# Patient Record
Sex: MALE | Race: WHITE | NOT HISPANIC OR LATINO | URBAN - METROPOLITAN AREA
[De-identification: names, ages, dates, MRNs, and addresses within clinical notes are randomized per-mention and may not be internally consistent; named-entity substitution may affect disease eponyms.]

---

## 2018-01-02 ENCOUNTER — EMERGENCY (EMERGENCY)
Facility: HOSPITAL | Age: 33
LOS: 0 days | Discharge: HOME | End: 2018-01-03

## 2018-01-02 DIAGNOSIS — J02.9 ACUTE PHARYNGITIS, UNSPECIFIED: ICD-10-CM

## 2018-01-02 DIAGNOSIS — J03.90 ACUTE TONSILLITIS, UNSPECIFIED: ICD-10-CM

## 2019-06-03 ENCOUNTER — EMERGENCY (EMERGENCY)
Facility: HOSPITAL | Age: 34
LOS: 0 days | Discharge: HOME | End: 2019-06-03
Admitting: EMERGENCY MEDICINE
Payer: MEDICAID

## 2019-06-03 VITALS
OXYGEN SATURATION: 95 % | TEMPERATURE: 99 F | HEART RATE: 94 BPM | SYSTOLIC BLOOD PRESSURE: 126 MMHG | RESPIRATION RATE: 18 BRPM | DIASTOLIC BLOOD PRESSURE: 70 MMHG
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
## 2019-06-03 DIAGNOSIS — S40.212A ABRASION OF LEFT SHOULDER, INITIAL ENCOUNTER: ICD-10-CM
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
## 2019-06-03 DIAGNOSIS — T74.11XA ADULT PHYSICAL ABUSE, CONFIRMED, INITIAL ENCOUNTER: ICD-10-CM

## 2019-06-03 DIAGNOSIS — S09.90XA UNSPECIFIED INJURY OF HEAD, INITIAL ENCOUNTER: ICD-10-CM

## 2019-06-03 DIAGNOSIS — S20.212A CONTUSION OF LEFT FRONT WALL OF THORAX, INITIAL ENCOUNTER: ICD-10-CM
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
## 2019-06-03 PROCEDURE — 71101 X-RAY EXAM UNILAT RIBS/CHEST: CPT | Mod: 26,LT

## 2019-06-03 PROCEDURE — 99283 EMERGENCY DEPT VISIT LOW MDM: CPT

## 2019-06-03 RX ORDER — ACETAMINOPHEN 500 MG
975 TABLET ORAL ONCE
Refills: 0 | Status: COMPLETED | OUTPATIENT
Start: 2019-06-03 | End: 2019-06-03

## 2019-06-03 RX ADMIN — Medication 975 MILLIGRAM(S): at 10:17

## 2019-06-03 NOTE — ED ADULT TRIAGE NOTE - MODE OF ARRIVAL
Barry BARRERA Javier   5/25/2017 8:15 AM   Anticoagulation Therapy Visit    Description:  53 year old male   Provider:  [facility]   Department:  [facility]           INR as of 5/25/2017     Today's INR 2.4      Anticoagulation Summary as of 5/25/2017     INR goal 2.0-3.0   Today's INR 2.4   Full instructions 5/25: 5 mg; 5/29: 5 mg; 6/1: 5 mg; 6/5: 5 mg; 6/8: 5 mg; 6/12: 5 mg; 6/15: 5 mg; Otherwise 10 mg on Mon; 7.5 mg all other days   Next INR check 6/16/2017    Indications   Long-term (current) use of anticoagulants [Z79.01] [Z79.01]  DVT (deep venous thrombosis) (H) (Resolved) [I82.409]         Your next Anticoagulation Clinic appointment(s)     Jun 16, 2017  8:15 AM CDT   Anticoagulation Visit with [facility]   [facility] [place] ([address] [address] [phone]              Contact Numbers     Clinic Number:         May 2017 Details    Sun Mon Tue Wed Thu Fri Sat      1               2               3               4               5               6                 7               8               9               10               11               12               13                 14               15               16               17               18               19               20                 21               22               23               24               25      5 mg   See details      26      7.5 mg         27      7.5 mg           28      7.5 mg         29      5 mg         30      7.5 mg         31      7.5 mg             Date Details   05/25 This INR check               How to take your warfarin dose     To take:  5 mg Take 1 of the 5 mg tablets.    To take:  7.5 mg Take 1.5 of the 5 mg tablets.           June 2017 Details    Sun Mon Tue Wed Thu Fri Sat         1      5 mg         2      7.5 mg         3      7.5 mg           4      7.5 mg         5      5 mg         6      7.5 mg          7      7.5 mg         8      5 mg         9      7.5 mg         10      7.5 mg           11      7.5 mg         12      5 mg         13      7.5 mg         14      7.5 mg         15      5 mg         16            17                 18               19               20               21               22               23               24                 25               26               27               28               29               30                 Date Details   No additional details    Date of next INR:  6/16/2017         How to take your warfarin dose     To take:  5 mg Take 1 of the 5 mg tablets.    To take:  7.5 mg Take 1.5 of the 5 mg tablets.            EMS
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
## 2019-06-03 NOTE — ED PROVIDER NOTE - NSFOLLOWUPINSTRUCTIONS_ED_ALL_ED_FT
Abrasion  Image   An abrasion is a cut or a scrape on the surface of your skin. An abrasion does not go through all the layers of your skin. It is important to take good care of your abrasion to prevent infection.    Follow these instructions at home:  Medicines     Take or apply over-the-counter and prescription medicines only as told by your doctor.  If you were prescribed an antibiotic medicine, apply it as told by your doctor. Do not stop using the antibiotic even if you start to feel better.  Wound care     Clean the wound 2–3 times a day or as often as told by your doctor. To do this:  Wash the wound with mild soap and water.  Rinse off the soap.  Pat a clean towel on the wound to dry it. Do not rub it.  Keep the bandage (dressing) clean and dry as told by your doctor.  Follow instructions from your doctor about how to take care of your wound. Make sure you:   Wash your hands with soap and water before you change your bandage. If you cannot use soap and water, use hand .  Change your bandage as told by your doctor.   Check your wound every day for signs of infection. Check for:  Redness, swelling, or pain.   Fluid or blood.   Warmth.   Pus or a bad smell.  If directed, put ice on the injured area. To do this:  Put ice in a plastic bag.   Place a towel between your skin and the bag.   Leave the ice on for 20 minutes, 2–3 times a day.  General instructions     Do not take baths, swim, or use a hot tub until your doctor says it is okay.  If there is swelling, raise (elevate) the injured area above the level of your heart while you are sitting or lying down.  Keep all follow-up visits as told by your doctor. This is important.  Contact a doctor if:  You were given a tetanus shot, and you have any of these where the needle went in:  Swelling.  Very bad pain.  Redness.  Bleeding.  You have a lot of pain, and medicine does not help.  You have any of these at the site of the wound:  More redness.  More swelling.  More pain.  Get help right away if:  You have a red streak going away from your wound.  You have a fever.  You have fluid, blood, or pus coming from your wound.  There is a bad smell coming from your wound or bandage.  Summary  An abrasion is a cut or a scrape on the surface of your skin.  Take good care of your abrasion so it does not get infected.  Clean the wound with mild soap and water, and change your bandage as told by your doctor.  Call your doctor if you have redness, swelling, or more pain in your wound.  Get help right away if you have a fever or if you have fluid, blood, pus, a bad smell, or a red streak coming from the wound.  This information is not intended to replace advice given to you by your health care provider. Make sure you discuss any questions you have with your health care provider. Intimate Partner Violence Information  Intimate partner violence, also called domestic abuse or relationship abuse, is a pattern of behaviors used by one partner to gain or maintain power and control over the other partner. Intimate partner violence can happen to women and men and can happen between people who are or were:  .  Dating.  Living together.  What are the types of intimate partner violence?  Intimate partner violence can involve physical, emotional, psychological, sexual, and economic abuse, or stalking by a current or former partner. Different types of abuse can occur at the same time within the same relationship.  Physical abuse. This includes rough handling, threats with a weapon, throwing objects, pushing, or hitting.  Emotional and psychological abuse. This includes verbal attacks, rejection, humiliation, intimidation, social isolation, or threats. Abuse may also include limiting contact with family and friends.  Sexual assault. Sexual assault is any unwanted sexual activity that occurs without clear permission (consent) from both people. This includes unwanted touching and sexual harassment.  Economic abuse. This includes controlling money, food, transportation, or other belongings.  Stalking. This involves such things as repeated, unwanted phone calls, e-mails, or text messages, or watching the victim from a distance.  What are some warning signs of intimate partner violence?  Physical signs     Bruises.  Broken bones.  Burns or cuts.  Physical pain.  Head injury.  Emotional and psychological signs     Crying.  Depression.  Hopelessness.  Desperation.  Trouble sleeping.  Fear of the partner.  Anxiety.  Suicidal thoughts or behavior.  Antisocial behavior.  Low self-esteem.  Fear of intimacy.  Flashbacks.  Sexual signs     Bruising, swelling, or bleeding of the genital or rectal area.  Signs of an STI, such as genital sores, warts, or discharge coming from the genital area.  Pain in the genital area.  Unintended pregnancy.  Problems with pregnancy.  What are common behaviors of those affected by intimate partner violence?  Those affected by intimate partner violence may:  Be late to work or other events.  Not show up to places as promised.  Have to let their partner know where they are and who they are with.  Be isolated or kept from seeing friends or family.  Make comments about their partner's temper or behavior.  Make excuses for their partner.  Engage in high-risk sexual behaviors.  Use drugs or alcohol.  Have unhealthy eating behaviors.  What are common feelings of those affected by intimate partner violence?  Victims of intimate partner violence may feel that they:  Must be careful not to say or do things that trigger their partner's anger.  Cannot do anything right.  Deserve to be treated badly.  Overreact to their partner's behavior or temper.  Cannot trust their own feelings.  Cannot trust other people.  Are trapped.  May have their children taken away by their partner.  Are emotionally drained or numb.  Are in danger.  Might have to kill their partner to survive.  Where can you get help?  If you do not feel safe searching for help online at home, use a computer at a [employer] to access the Internet. Call 911 if you are in immediate danger or need medical help.    Intimate partner violence hotlines and websites     The National Domestic Violence Hotline.  24-hour phone hotline: 1-677.228.9282 (SAFE) or 1-220.406.3662 (TTY).  Videophone: available Monday through Friday, 9 a.m. to 5 p.m. Call 1-532.345.5153.  [employer]  The [employer] Sexual Assault Hotline.  24-hour phone hotline: 1-524.936.5994.  safehelplSummize.marshallindex  Shelters for victims of intimate partner violence     If you are a victim of intimate partner violence, there are resources to help you find a temporary place for you and your children to live (shelter). The specific address of these shelters is often not known to the public.    Police     Report assaults, threats, and stalking to the police.    Counselors and counseling centers     Image   Counseling can help you cope with difficult emotions and empower you to plan for your future safety. The topics you discuss with a counselor are private and confidential. Children of intimate partner violence victims also might need counseling to manage stress and anxiety.    The court system     You can work with a  or an advocate to get legal protection against an abuser. Protection includes restraining orders and private addresses. Crimes against you, such as assault, can also be prosecuted through the courts. Laws vary by state.    Follow these instructions at home:  Create a safety plan that includes ways to remain safe while you are in an abusive relationship, while you are planning to leave, or after you leave. This plan may be created by the victim alone or with assistance from the domestic violence hotline staff or local shelter staff. Your safety plan may include:  How to cope with emotions.  How to tell friends and family about the abuse.  How to take legal action.  How to create a safe home environment.   How to keep your children safe.  Emergency plans for life-threatening situations.  Get help right away if you:  Feel like you are in immediate danger.  Feel like you may hurt yourself or others.  If you ever feel like you may hurt yourself or others, or have thoughts about taking your own life, get help right away. You can go to your nearest emergency department or call:   Your local emergency services (911 in the [place]   A suicide crisis helpline, such as the National Suicide Prevention Lifeline at 1-716.671.7535. This is open 24 hours a day.   Summary  If you are a victim of intimate partner violence, there are resources to help you find a temporary place for you and your children to live (shelter).  Create a safety plan that includes ways to remain safe while you are in an abusive relationship, while you are planning to leave, or after you leave.  This information is not intended to replace advice given to you by your health care provider. Make sure you discuss any questions you have with your health care provider.              Abrasion  Image   An abrasion is a cut or a scrape on the surface of your skin. An abrasion does not go through all the layers of your skin. It is important to take good care of your abrasion to prevent infection.    Follow these instructions at home:  Medicines     Take or apply over-the-counter and prescription medicines only as told by your doctor.  If you were prescribed an antibiotic medicine, apply it as told by your doctor. Do not stop using the antibiotic even if you start to feel better.  Wound care     Clean the wound 2–3 times a day or as often as told by your doctor. To do this:  Wash the wound with mild soap and water.  Rinse off the soap.  Pat a clean towel on the wound to dry it. Do not rub it.  Keep the bandage (dressing) clean and dry as told by your doctor.  Follow instructions from your doctor about how to take care of your wound. Make sure you:   Wash your hands with soap and water before you change your bandage. If you cannot use soap and water, use hand .  Change your bandage as told by your doctor.   Check your wound every day for signs of infection. Check for:  Redness, swelling, or pain.   Fluid or blood.   Warmth.   Pus or a bad smell.  If directed, put ice on the injured area. To do this:  Put ice in a plastic bag.   Place a towel between your skin and the bag.   Leave the ice on for 20 minutes, 2–3 times a day.  General instructions     Do not take baths, swim, or use a hot tub until your doctor says it is okay.  If there is swelling, raise (elevate) the injured area above the level of your heart while you are sitting or lying down.  Keep all follow-up visits as told by your doctor. This is important.  Contact a doctor if:  You were given a tetanus shot, and you have any of these where the needle went in:  Swelling.  Very bad pain.  Redness.  Bleeding.  You have a lot of pain, and medicine does not help.  You have any of these at the site of the wound:  More redness.  More swelling.  More pain.  Get help right away if:  You have a red streak going away from your wound.  You have a fever.  You have fluid, blood, or pus coming from your wound.  There is a bad smell coming from your wound or bandage.  Summary  An abrasion is a cut or a scrape on the surface of your skin.  Take good care of your abrasion so it does not get infected.  Clean the wound with mild soap and water, and change your bandage as told by your doctor.  Call your doctor if you have redness, swelling, or more pain in your wound.  Get help right away if you have a fever or if you have fluid, blood, pus, a bad smell, or a red streak coming from the wound.  This information is not intended to replace advice given to you by your health care provider. Make sure you discuss any questions you have with your health care provider.

## 2019-06-03 NOTE — ED PROVIDER NOTE - PHYSICAL EXAMINATION
CONST: Well appearing in NAD  EYES: PERRL, EOMI, Sclera and conjunctiva clear.   ENT: No nasal discharge. TM's clear B/L without drainage.   NECK: Non-tender, no meningeal signs. Scattered excoriations to neck.  CARD: Normal S1 S2; Normal rate and rhythm  RESP: Equal BS B/L, No wheezes, rhonchi or rales. No distress  GI: Soft, non-tender, non-distended. Mild L anterior rib tenderness overlying #9,10. No crepitus. No flank ecchymosis.  MS: Normal ROM in all extremities. No midline spinal tenderness.  SKIN: Multiple linear excoriations to anterior L shoulder, L chest and upper arm. No visible glass. No active bleeding.   NEURO: A&Ox3, No focal deficits. Strength 5/5 with no sensory deficits. Steady gait
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
## 2019-06-03 NOTE — ED PROVIDER NOTE - NS ED ROS FT
CONST: No fever, chills or bodyaches  EYES: No pain, redness, drainage or visual changes.  ENT: No ear pain or discharge, nasal discharge or congestion. No sore throat  CARD: No chest pain, palpitations  RESP: No SOB, cough  GI: No abdominal pain, N/V/D  MS: No joint pain, back pain or extremity pain/injury  SKIN: Abrasions to L shoulder and arm  NEURO: No headache, dizziness, paresthesias or LOC

## 2020-02-16 ENCOUNTER — EMERGENCY (EMERGENCY)
Facility: HOSPITAL | Age: 35
LOS: 0 days | Discharge: HOME | End: 2020-02-17
Attending: EMERGENCY MEDICINE | Admitting: EMERGENCY MEDICINE
Payer: MEDICAID
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
## 2020-02-16 DIAGNOSIS — S01.511A LACERATION WITHOUT FOREIGN BODY OF LIP, INITIAL ENCOUNTER: ICD-10-CM
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
## 2020-02-16 PROCEDURE — 99283 EMERGENCY DEPT VISIT LOW MDM: CPT | Mod: 25

## 2020-02-16 PROCEDURE — 12011 RPR F/E/E/N/L/M 2.5 CM/<: CPT

## 2020-02-17 VITALS
RESPIRATION RATE: 18 BRPM | TEMPERATURE: 98 F | HEIGHT: 72 IN | OXYGEN SATURATION: 99 % | DIASTOLIC BLOOD PRESSURE: 94 MMHG | SYSTOLIC BLOOD PRESSURE: 142 MMHG | WEIGHT: 169.98 LBS | HEART RATE: 127 BPM

## 2020-02-17 VITALS — HEART RATE: 96 BPM
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
## 2020-02-17 NOTE — ED PROVIDER NOTE - CARE PROVIDER_API CALL
urgent care or yor doctor,   5 days for suture removal  Phone: (   )    -  Fax: (   )    -  Follow Up Time:

## 2020-02-17 NOTE — ED PROVIDER NOTE - CLINICAL SUMMARY MEDICAL DECISION MAKING FREE TEXT BOX
Wound care and repair. Pt instructed on proper wound care.  Will monitor area closely for signs of infection and will return if any redness, streaking, drainage, increased swelling, fevers or any other concerns.
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
## 2020-02-17 NOTE — ED PROVIDER NOTE - PATIENT PORTAL LINK FT
You can access the FollowMyHealth Patient Portal offered by [facility] by registering at the following website: [email or web address]. By joining [employer]’s FollowMyHealth portal, you will also be able to view your health information using other applications (apps) compatible with our system.

## 2020-02-17 NOTE — ED PROVIDER NOTE - PHYSICAL EXAMINATION
VITAL SIGNS: I have reviewed nursing notes and confirm.  CONSTITUTIONAL: Well-developed; well-nourished; in no acute distress.  SKIN: Skin exam is warm and dry, no acute rash.  HEAD: Normocephalic; atraumatic.  EYES: PERRL, EOM intact; conjunctiva and sclera clear.  ENT: No nasal discharge; airway clear. Dentition intact. (+) 1 cm laceration lateral to philtrum on right side with no active bleeding. (+) mild ecchymosis to right upper inner lip without internal laceration.   NECK: Supple; non tender.  CARD: S1, S2 normal; no murmurs, gallops, or rubs. Regular rate and rhythm.  RESP: No wheezes, rales or rhonchi. Speaking in full sentences.   EXT: Normal ROM.   NEURO: Alert, oriented. Grossly unremarkable. No focal deficits. CN II-XII intact. No dysmetria. No ataxia. Sensation intact and equal throughout. Strength 5/5 throughout. Gait steady.

## 2020-02-17 NOTE — ED PROVIDER NOTE - PROVIDER TOKENS
FREE:[LAST:[urgent care or yor doctor],PHONE:[(   )    -],FAX:[(   )    -],ADDRESS:[5 days for suture removal]]

## 2020-02-22 ENCOUNTER — EMERGENCY (EMERGENCY)
Facility: HOSPITAL | Age: 35
LOS: 0 days | Discharge: HOME | End: 2020-02-22
Attending: EMERGENCY MEDICINE | Admitting: EMERGENCY MEDICINE
Payer: MEDICAID

## 2020-02-22 VITALS
TEMPERATURE: 97 F | SYSTOLIC BLOOD PRESSURE: 133 MMHG | HEIGHT: 72 IN | HEART RATE: 75 BPM | WEIGHT: 169.98 LBS | RESPIRATION RATE: 18 BRPM | OXYGEN SATURATION: 98 % | DIASTOLIC BLOOD PRESSURE: 76 MMHG

## 2020-02-22 DIAGNOSIS — S01.511D LACERATION WITHOUT FOREIGN BODY OF LIP, SUBSEQUENT ENCOUNTER: ICD-10-CM
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
## 2020-02-22 PROCEDURE — L9995: CPT
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
## 2020-02-22 NOTE — ED PROVIDER NOTE - PROGRESS NOTE DETAILS
TC TC: Previously healthy 34 yo M who presents with suture removal. No s/sx of infection. Removed without difficulty. Wound appears well healed. Instructed on supportive care and to f/u with PMD. Strict ED return precautions given. Pt verbalized understanding and was agreeable with plan.

## 2020-02-22 NOTE — ED PROVIDER NOTE - ATTENDING CONTRIBUTION TO CARE
I personally evaluated the patient. I reviewed the Resident’s or Physician Assistant’s note (as assigned above), and agree with the findings and plan except as documented in my note.     35 male here for suture removal for facial sutures placed recently.     No other complaints    PE: male in no distress. well healing laceration to right upper lip. No dehiscence.     Impression: wound check    Plan: wound management, outpatient care

## 2020-02-22 NOTE — ED PROVIDER NOTE - PATIENT PORTAL LINK FT
You can access the FollowMyHealth Patient Portal offered by [facility] by registering at the following website: [email or web address]. By joining [employer]’s FollowMyHealth portal, you will also be able to view your health information using other applications (apps) compatible with our system.

## 2020-02-22 NOTE — ED PROVIDER NOTE - PHYSICAL EXAMINATION
CONSTITUTIONAL: well developed, nontoxic appearing, in no acute distress, speaking in full sentences  SKIN: warm, dry, no rash, cap refill < 2 seconds  HEENT: normocephalic, atraumatic, no conjunctival erythema, moist mucous membranes, patent airway, well healing laceration to R upper lip with 2 stitches in place, no erythema, no drainage, no fluctuance, no induration  NECK: supple, no masses  CV:  regular rate, regular rhythm, 2+ radial pulses bilaterally  RESP: no wheezes, no rales, no rhonchi, normal work of breathing  ABD: soft, nontender, nondistended, no rebound, no guarding  MSK: normal ROM, no cyanosis, no edema  NEURO: alert, oriented, grossly unremarkable  PSYCH: cooperative, appropriate

## 2020-02-22 NOTE — ED PROVIDER NOTE - CARE PROVIDER_API CALL
Lia Cornell)  Internal Medicine  [address] [place] [address]  Phone: [phone]  Fax: [phone]  Follow Up Time:

## 2020-02-22 NOTE — ED PROCEDURE NOTE - ATTENDING CONTRIBUTION TO CARE
I personally evaluated the patient. I reviewed the Resident’s or Physician Assistant’s note (as assigned above), and agree with the findings and plan except as documented in my note.     I was present for and supervised the key critical aspects of the procedures performed during the care of the patient.

## 2020-02-22 NOTE — ED ADULT NURSE NOTE - EXTENSIONS OF SELF_ADULT
"Requested Prescriptions   Pending Prescriptions Disp Refills     fluticasone-salmeterol (ADVAIR) 250-50 MCG/DOSE inhaler [Pharmacy Med Name: FLUTICASONE-SALMETEROL 250-50 AEPB]  5     Sig: INHALE 1 PUFF BY MOUTH INTO THE LUNGS TWICE DAILY       Inhaled Steroids Protocol Failed - 4/8/2019  9:01 AM        Failed - Asthma control assessment score within normal limits in last 6 months     Please review ACT score.           Passed - Patient is age 12 or older        Passed - Medication is active on med list        Passed - Recent (6 mo) or future (30 days) visit within the authorizing provider's specialty     Patient had office visit in the last 6 months or has a visit in the next 30 days with authorizing provider or within the authorizing provider's specialty.  See \"Patient Info\" tab in inbasket, or \"Choose Columns\" in Meds & Orders section of the refill encounter.            Last Written Prescription Date:  9/5/18  Last Fill Quantity: 1,  # refills: 5   Last office visit: 3/8/2019 with prescribing provider:     Future Office Visit:      "
Routing refill request to provider for review/approval because:  ACT not current    ACT Total Scores 8/16/2017 2/21/2018 9/11/2018   ACT TOTAL SCORE - - -   ASTHMA ER VISITS - - -   ASTHMA HOSPITALIZATIONS - - -   ACT TOTAL SCORE (Goal Greater than or Equal to 20) 21 23 22   In the past 12 months, how many times did you visit the emergency room for your asthma without being admitted to the hospital? 0 0 0   In the past 12 months, how many times were you hospitalized overnight because of your asthma? 0 0 0     Cynthia GALO RN      
None

## 2020-02-22 NOTE — ED PROVIDER NOTE - CLINICAL SUMMARY MEDICAL DECISION MAKING FREE TEXT BOX
35 Male here for suture removal. Had wound management, will discharge with return and follow up instructions.

## 2020-02-22 NOTE — ED PROVIDER NOTE - NS ED ROS FT
GEN:  no fever, no chills  NEURO:  no headache, no dizziness  ENT: no sore throat, no runny nose  CV:  no chest pain, no palpitations  RESP:  no sob, no cough  GI:  no nausea, no vomiting, no abdominal pain  :  no dysuria, no urinary frequency, no hematuria  MSK:  no joint pain, no edema  SKIN:  no rash, no bruising  HEME: no easy bleeding

## 2020-11-03 ENCOUNTER — EMERGENCY (EMERGENCY)
Facility: HOSPITAL | Age: 35
LOS: 0 days | Discharge: HOME | End: 2020-11-03
Attending: EMERGENCY MEDICINE | Admitting: EMERGENCY MEDICINE
Payer: MEDICAID

## 2020-11-03 VITALS
OXYGEN SATURATION: 99 % | HEART RATE: 108 BPM | DIASTOLIC BLOOD PRESSURE: 77 MMHG | TEMPERATURE: 98 F | HEIGHT: 72 IN | SYSTOLIC BLOOD PRESSURE: 144 MMHG | WEIGHT: 175.05 LBS | RESPIRATION RATE: 20 BRPM

## 2020-11-03 VITALS — RESPIRATION RATE: 19 BRPM | OXYGEN SATURATION: 98 % | HEART RATE: 94 BPM

## 2020-11-03 DIAGNOSIS — K64.9 UNSPECIFIED HEMORRHOIDS: ICD-10-CM

## 2020-11-03 DIAGNOSIS — R42 DIZZINESS AND GIDDINESS: ICD-10-CM

## 2020-11-03 DIAGNOSIS — K92.1 MELENA: ICD-10-CM

## 2020-11-03 DIAGNOSIS — R11.0 NAUSEA: ICD-10-CM

## 2020-11-03 DIAGNOSIS — R53.1 WEAKNESS: ICD-10-CM

## 2020-11-03 PROBLEM — Z00.00 ENCOUNTER FOR PREVENTIVE HEALTH EXAMINATION: Status: ACTIVE | Noted: 2020-11-03

## 2020-11-03 LAB
ALBUMIN SERPL ELPH-MCNC: 4.5 G/DL — SIGNIFICANT CHANGE UP (ref 3.5–5.2)
ALP SERPL-CCNC: 51 U/L — SIGNIFICANT CHANGE UP (ref 30–115)
ALT FLD-CCNC: 14 U/L — SIGNIFICANT CHANGE UP (ref 0–41)
ANION GAP SERPL CALC-SCNC: 16 MMOL/L — HIGH (ref 7–14)
AST SERPL-CCNC: 18 U/L — SIGNIFICANT CHANGE UP (ref 0–41)
BASOPHILS # BLD AUTO: 0.03 K/UL — SIGNIFICANT CHANGE UP (ref 0–0.2)
BASOPHILS NFR BLD AUTO: 0.6 % — SIGNIFICANT CHANGE UP (ref 0–1)
BILIRUB SERPL-MCNC: 1.5 MG/DL — HIGH (ref 0.2–1.2)
BUN SERPL-MCNC: 21 MG/DL — HIGH (ref 10–20)
CALCIUM SERPL-MCNC: 9.3 MG/DL — SIGNIFICANT CHANGE UP (ref 8.5–10.1)
CHLORIDE SERPL-SCNC: 97 MMOL/L — LOW (ref 98–110)
CO2 SERPL-SCNC: 24 MMOL/L — SIGNIFICANT CHANGE UP (ref 17–32)
CREAT SERPL-MCNC: 1.2 MG/DL — SIGNIFICANT CHANGE UP (ref 0.7–1.5)
EOSINOPHIL # BLD AUTO: 0.23 K/UL — SIGNIFICANT CHANGE UP (ref 0–0.7)
EOSINOPHIL NFR BLD AUTO: 4.6 % — SIGNIFICANT CHANGE UP (ref 0–8)
GLUCOSE SERPL-MCNC: 97 MG/DL — SIGNIFICANT CHANGE UP (ref 70–99)
HCT VFR BLD CALC: 45.6 % — SIGNIFICANT CHANGE UP (ref 42–52)
HGB BLD-MCNC: 15.3 G/DL — SIGNIFICANT CHANGE UP (ref 14–18)
IMM GRANULOCYTES NFR BLD AUTO: 0.2 % — SIGNIFICANT CHANGE UP (ref 0.1–0.3)
LYMPHOCYTES # BLD AUTO: 1.75 K/UL — SIGNIFICANT CHANGE UP (ref 1.2–3.4)
LYMPHOCYTES # BLD AUTO: 35.1 % — SIGNIFICANT CHANGE UP (ref 20.5–51.1)
MAGNESIUM SERPL-MCNC: 1.9 MG/DL — SIGNIFICANT CHANGE UP (ref 1.8–2.4)
MCHC RBC-ENTMCNC: 28.2 PG — SIGNIFICANT CHANGE UP (ref 27–31)
MCHC RBC-ENTMCNC: 33.6 G/DL — SIGNIFICANT CHANGE UP (ref 32–37)
MCV RBC AUTO: 84 FL — SIGNIFICANT CHANGE UP (ref 80–94)
MONOCYTES # BLD AUTO: 0.42 K/UL — SIGNIFICANT CHANGE UP (ref 0.1–0.6)
MONOCYTES NFR BLD AUTO: 8.4 % — SIGNIFICANT CHANGE UP (ref 1.7–9.3)
NEUTROPHILS # BLD AUTO: 2.55 K/UL — SIGNIFICANT CHANGE UP (ref 1.4–6.5)
NEUTROPHILS NFR BLD AUTO: 51.1 % — SIGNIFICANT CHANGE UP (ref 42.2–75.2)
NRBC # BLD: 0 /100 WBCS — SIGNIFICANT CHANGE UP (ref 0–0)
PLATELET # BLD AUTO: 229 K/UL — SIGNIFICANT CHANGE UP (ref 130–400)
POTASSIUM SERPL-MCNC: 4.5 MMOL/L — SIGNIFICANT CHANGE UP (ref 3.5–5)
POTASSIUM SERPL-SCNC: 4.5 MMOL/L — SIGNIFICANT CHANGE UP (ref 3.5–5)
PROT SERPL-MCNC: 6.7 G/DL — SIGNIFICANT CHANGE UP (ref 6–8)
RBC # BLD: 5.43 M/UL — SIGNIFICANT CHANGE UP (ref 4.7–6.1)
RBC # FLD: 11.8 % — SIGNIFICANT CHANGE UP (ref 11.5–14.5)
SODIUM SERPL-SCNC: 137 MMOL/L — SIGNIFICANT CHANGE UP (ref 135–146)
WBC # BLD: 4.99 K/UL — SIGNIFICANT CHANGE UP (ref 4.8–10.8)
WBC # FLD AUTO: 4.99 K/UL — SIGNIFICANT CHANGE UP (ref 4.8–10.8)

## 2020-11-03 PROCEDURE — 93010 ELECTROCARDIOGRAM REPORT: CPT

## 2020-11-03 PROCEDURE — 99284 EMERGENCY DEPT VISIT MOD MDM: CPT

## 2020-11-03 RX ORDER — MINERAL OIL, PETROLATUM, PHENYLEPHRINE HCL 2.5; 140; 749 MG/G; MG/G; MG/G
1 OINTMENT TOPICAL
Qty: 1 | Refills: 0
Start: 2020-11-03 | End: 2020-11-16

## 2020-11-03 RX ORDER — SODIUM CHLORIDE 9 MG/ML
1000 INJECTION, SOLUTION INTRAVENOUS ONCE
Refills: 0 | Status: COMPLETED | OUTPATIENT
Start: 2020-11-03 | End: 2020-11-03

## 2020-11-03 RX ADMIN — SODIUM CHLORIDE 1000 MILLILITER(S): 9 INJECTION, SOLUTION INTRAVENOUS at 08:06

## 2020-11-03 NOTE — ED PROVIDER NOTE - ATTENDING CONTRIBUTION TO CARE
34 yo m with hx of hemorrhoids x years with intermittent blood in the stools, here today reporting generalized weakness, no fever or URI sx. + nausea, -v/d. No focal neuro complaints.  Exam asd doc. Well appearing, no pallor. Rectal by PA. Labs IVF EKG and reeval

## 2020-11-03 NOTE — ED PROVIDER NOTE - PHYSICAL EXAMINATION
Physical Exam    Vital Signs: I have reviewed the initial vital signs.  Constitutional: well-nourished, appears stated age, no acute distress  Eyes: Conjunctiva pink, Sclera clear  Cardiovascular: S1 and S2, regular rate, regular rhythm, well-perfused extremities, radial pulses equal and 2+  Respiratory: unlabored respiratory effort, clear to auscultation bilaterally no wheezing, rales and rhonchi  Gastrointestinal: soft, non-tender abdomen, no pulsatile mass, normal bowl sounds  Rectal: Chaperoned with Rn Gordon, two large non-thrombosed hemorrhoids, no blood per rectum, no ttp.    Musculoskeletal: supple neck, no lower extremity edema, no midline tenderness  Integumentary: warm, dry, no rash  Neurologic: awake, alert, cranial nerves II-XII grossly intact, extremities’ motor and sensory functions grossly intact, normal gait, no pronator drift.

## 2020-11-03 NOTE — ED PROVIDER NOTE - PATIENT PORTAL LINK FT
You can access the FollowMyHealth Patient Portal offered by [facility] by registering at the following website: [email or web address]. By joining [employer]’s FollowMyHealth portal, you will also be able to view your health information using other applications (apps) compatible with our system.

## 2020-11-03 NOTE — ED PROVIDER NOTE - CARE PROVIDER_API CALL
Micah Tolbert)  ColonRectal Surgery; Surgery  [address] [facility], [address]rd [place] [address]  Phone: [phone]  Fax: [phone]  Follow Up Time: 1-3 Days

## 2020-11-03 NOTE — ED PROVIDER NOTE - CLINICAL SUMMARY MEDICAL DECISION MAKING FREE TEXT BOX
pt with generalized weakness; due to chronic hemorrhoids labs performed. stable at dc. EKG for tachy on triage that resolved

## 2020-11-03 NOTE — ED PROVIDER NOTE - CARE PROVIDERS DIRECT ADDRESSES
[email or web address]

## 2020-11-03 NOTE — ED PROVIDER NOTE - NS ED ROS FT
Constitutional: (-) fever, (-) chills, (+) weakness  Eyes: (-) visual changes  ENT: (-) nasal congestions  Cardiovascular: (-) chest pain, (-) syncope  Respiratory: (-) cough, (-) shortness of breath, (-) dyspnea,   Gastrointestinal: (-) vomiting, (-) diarrhea, (-)nausea,  Musculoskeletal: (-) neck pain, (-) back pain, (-) joint pain,  Integumentary: (-) rash, (-) edema, (-) bruises  Neurological: (-) headache, (-) loc, (-) dizziness, (-) tingling, (-)numbness, (+) lightheadedness  Peripheral Vascular: (-) leg swelling  :  (-)dysuria,  (-) hematuria  Allergic/Immunologic: (-) pruritus

## 2020-11-03 NOTE — ED PROVIDER NOTE - OBJECTIVE STATEMENT
36 yo male, pmh of hemorrhoids, presents to ed for evaluation. Pt explains for past several years will get flairs of hemorrhoids and blood with bms, pt states present for last four days, no specific pain or radiation, a/w weakness and lightheadedness today. States had appt for gi/ colonoscopy, but did not f/u. Denies fever, chills, cp, sob, le swelling, nvd, abd pain, dysuria.

## 2020-11-11 ENCOUNTER — APPOINTMENT (OUTPATIENT)
Dept: SURGERY | Facility: CLINIC | Age: 35
End: 2020-11-11
Payer: MEDICAID

## 2020-11-11 VITALS
TEMPERATURE: 97.2 F | HEART RATE: 96 BPM | WEIGHT: 185 LBS | BODY MASS INDEX: 25.06 KG/M2 | DIASTOLIC BLOOD PRESSURE: 76 MMHG | SYSTOLIC BLOOD PRESSURE: 122 MMHG | HEIGHT: 72 IN

## 2020-11-11 DIAGNOSIS — Z78.9 OTHER SPECIFIED HEALTH STATUS: ICD-10-CM

## 2020-11-11 DIAGNOSIS — K64.9 UNSPECIFIED HEMORRHOIDS: ICD-10-CM

## 2020-11-11 DIAGNOSIS — K64.5 PERIANAL VENOUS THROMBOSIS: ICD-10-CM

## 2020-11-11 DIAGNOSIS — Z72.0 TOBACCO USE: ICD-10-CM

## 2020-11-11 DIAGNOSIS — Z80.43 FAMILY HISTORY OF MALIGNANT NEOPLASM OF TESTIS: ICD-10-CM

## 2020-11-11 PROCEDURE — 99203 OFFICE O/P NEW LOW 30 MIN: CPT

## 2020-11-11 PROCEDURE — 99072 ADDL SUPL MATRL&STAF TM PHE: CPT

## 2020-11-11 NOTE — HISTORY OF PRESENT ILLNESS
[FreeTextEntry1] : Patient is a 35M with no PMH who presents for evaluation of perianal swelling and pain.  Patient states it began about 1 week ago.  Since it has started, the pain has improved significantly and he no longer has pain.  He states he has constipation.  He has daily BM but has to strain at times.  He has recently started a high fiber diet with improvement. Patient denies fevers, chills, nausea, vomiting, abdominal pain, constipation, diarrhea, blood in his stool or unexpected weight loss.  Patient denies a family history of colon cancer rectal cancer or inflammatory bowel disease.  Patient has never had a colonoscopy.\par

## 2020-11-11 NOTE — ASSESSMENT
[FreeTextEntry1] : 35M with thrombosed external hemorrhoid\par \par I had a discussion with the patient regarding their thrombosed external hemorrhoid. Given that it has been present for more than 3 days, their symptoms are improving and there are no complications I recommended conservative management. I recommended a high fiber diet, a fiber supplement, increased water intake and sitz baths. We will see the patient back in 1 month.\par

## 2020-11-11 NOTE — PHYSICAL EXAM
[Abdomen Masses] : No abdominal masses [Abdomen Tenderness] : ~T No ~M abdominal tenderness [No HSM] : no hepatosplenomegaly [Excoriation] : no perianal excoriation [Fistula] : no fistulas [Wart] : no warts [Ulcer ___ cm] : no ulcers [Pilonidal Cyst] : no pilonidal cysts [Tender, Swollen] : nontender, non-swollen [Thrombosed] : that was thrombosed [Skin Tags] : there were no residual hemorrhoidal skin tags seen [Normal] : was normal [None] : there was no rectal mass  [Respiratory Effort] : normal respiratory effort [Normal Rate and Rhythm] : normal rate and rhythm [de-identified] : external examination shows a 2cm right sided thrombosed external hemorrhoid.  It is soft, non tender and the overlying tissue is intact. [de-identified] : FREDERICK and anoscopy not performed due to pain [de-identified] : awake, alert and in no acute distress

## 2020-12-16 ENCOUNTER — APPOINTMENT (OUTPATIENT)
Dept: SURGERY | Facility: CLINIC | Age: 35
End: 2020-12-16

## 2021-03-12 ENCOUNTER — EMERGENCY (EMERGENCY)
Facility: HOSPITAL | Age: 36
LOS: 0 days | Discharge: HOME | End: 2021-03-12
Attending: EMERGENCY MEDICINE | Admitting: EMERGENCY MEDICINE
Payer: MEDICAID

## 2021-03-12 VITALS
SYSTOLIC BLOOD PRESSURE: 132 MMHG | TEMPERATURE: 98 F | WEIGHT: 175.05 LBS | HEIGHT: 72 IN | OXYGEN SATURATION: 100 % | RESPIRATION RATE: 20 BRPM | HEART RATE: 109 BPM | DIASTOLIC BLOOD PRESSURE: 67 MMHG

## 2021-03-12 VITALS
HEART RATE: 90 BPM | OXYGEN SATURATION: 100 % | SYSTOLIC BLOOD PRESSURE: 130 MMHG | DIASTOLIC BLOOD PRESSURE: 64 MMHG | RESPIRATION RATE: 19 BRPM

## 2021-03-12 DIAGNOSIS — R36.9 URETHRAL DISCHARGE, UNSPECIFIED: ICD-10-CM

## 2021-03-12 DIAGNOSIS — Z20.2 CONTACT WITH AND (SUSPECTED) EXPOSURE TO INFECTIONS WITH A PREDOMINANTLY SEXUAL MODE OF TRANSMISSION: ICD-10-CM

## 2021-03-12 LAB
APPEARANCE UR: CLEAR — SIGNIFICANT CHANGE UP
BACTERIA # UR AUTO: ABNORMAL
BILIRUB UR-MCNC: NEGATIVE — SIGNIFICANT CHANGE UP
COLOR SPEC: YELLOW — SIGNIFICANT CHANGE UP
DIFF PNL FLD: NEGATIVE — SIGNIFICANT CHANGE UP
EPI CELLS # UR: ABNORMAL /HPF
GLUCOSE UR QL: NEGATIVE MG/DL — SIGNIFICANT CHANGE UP
HIV 1 & 2 AB SERPL IA.RAPID: SIGNIFICANT CHANGE UP
KETONES UR-MCNC: 15
LEUKOCYTE ESTERASE UR-ACNC: ABNORMAL
NITRITE UR-MCNC: NEGATIVE — SIGNIFICANT CHANGE UP
PH UR: 5.5 — SIGNIFICANT CHANGE UP (ref 5–8)
PROT UR-MCNC: NEGATIVE MG/DL — SIGNIFICANT CHANGE UP
SP GR SPEC: >=1.03 (ref 1.01–1.03)
UROBILINOGEN FLD QL: 0.2 MG/DL — SIGNIFICANT CHANGE UP (ref 0.2–0.2)
WBC UR QL: >50 /HPF

## 2021-03-12 PROCEDURE — 99284 EMERGENCY DEPT VISIT MOD MDM: CPT

## 2021-03-12 RX ORDER — CEFTRIAXONE 500 MG/1
500 INJECTION, POWDER, FOR SOLUTION INTRAMUSCULAR; INTRAVENOUS ONCE
Refills: 0 | Status: COMPLETED | OUTPATIENT
Start: 2021-03-12 | End: 2021-03-12

## 2021-03-12 RX ADMIN — CEFTRIAXONE 500 MILLIGRAM(S): 500 INJECTION, POWDER, FOR SOLUTION INTRAMUSCULAR; INTRAVENOUS at 05:38

## 2021-03-12 NOTE — ED PROVIDER NOTE - ATTENDING CONTRIBUTION TO CARE
36yM otherwise healthy p/w penile discharge and dysuria x 1d - admits to unprotected intercourse w/ multiple female partners.  +hx stds years ago, treated w/ abx.

## 2021-03-12 NOTE — ED PROVIDER NOTE - CLINICAL SUMMARY MEDICAL DECISION MAKING FREE TEXT BOX
36yM p/w penile discharge and dysuria w/ unprotected sexual activity w/ multiple partners.  pt well appearing w/ benign abd, no concern for sepsis, no resp distress, no ST or joint pain/swelling.  Labs sent and pt treated empirically, counseled on o/p f/u for hiv result, safe sex, return precautions.

## 2021-03-12 NOTE — ED PROVIDER NOTE - OBJECTIVE STATEMENT
37 y/o male with no significant PMH presents to the ED for evaluation of penile discharge he noticed this morning. pt reports burning with urination. pt reports he had unprotected sex with two different women this month. pt reports he had an std about 15 years ago and was given abx for it. pt denies blood in the urine, genital lesions or rashes, genital itching, abdominal pain, n/v/d/c, fever, headache, visual changes, rashes, chills, penile pain, testicular pain or swelling.

## 2021-03-12 NOTE — ED PROVIDER NOTE - NSFOLLOWUPCLINICS_GEN_ALL_ED_FT
[facility]  Infectious Disease  [address] [place] [id number]  Phone: [phone]  Fax:   Follow Up Time: 1-3 Days     Statement Selected

## 2021-03-12 NOTE — ED ADULT NURSE NOTE - NSIMPLEMENTINTERV_GEN_ALL_ED
Implemented All Universal Safety Interventions:  [place] to call system. Call bell, personal items and telephone within reach. Instruct patient to call for assistance. Room bathroom lighting operational. Non-slip footwear when patient is off stretcher. Physically safe environment: no spills, clutter or unnecessary equipment. Stretcher in lowest position, wheels locked, appropriate side rails in place.

## 2021-03-12 NOTE — ED PROVIDER NOTE - PHYSICAL EXAMINATION
Physical Exam    Vital Signs: I have reviewed the initial vital signs.  Constitutional: well-nourished, appears stated age, no acute distress  Eyes: Conjunctiva pink, Sclera clear  Cardiovascular: S1 and S2, regular rate, regular rhythm, well-perfused extremities, radial pulses equal and 2+ b/l.   Respiratory: unlabored respiratory effort, clear to auscultation bilaterally no wheezing, rales and rhonchi. pt is speaking full sentences. no accessory muscle use.   Gastrointestinal: soft, non-tender, nondistended abdomen, no pulsatile mass, normal bowl sounds, no rebound, no guarding, negative psoas, negative obturator, negative murphys. no organomegaly. no cva tenderness.   Genitourinary; exam chaperoned by nurse pittman. no testicular edema, erythema, or tenderness. no genital lesions or rashes. no genital itching. no drainage from the urethral meatus. no tenderness, erythema or edema to th glans penis or the shaft.   Musculoskeletal: supple neck, no lower extremity edema, no calf tenderness, no midline tenderness, no palpable spinal step offs  Integumentary: warm, dry, no rash  Neurologic: awake, alert  Psychiatric: appropriate mood, appropriate affect

## 2021-03-12 NOTE — ED PROVIDER NOTE - PROGRESS NOTE DETAILS
FF: pt agrees to ppx std tx. advised of return precautions discussed at bedside. advised to f/u with id. agreeable to dc.

## 2021-03-12 NOTE — ED PROVIDER NOTE - PATIENT PORTAL LINK FT
You can access the FollowMyHealth Patient Portal offered by [facility] by registering at the following website: [email or web address]. By joining [employer]’s FollowMyHealth portal, you will also be able to view your health information using other applications (apps) compatible with our system.

## 2021-03-12 NOTE — ED PROVIDER NOTE - NS ED ROS FT
CONST: No fever, chills or bodyaches  EYES: No pain, redness, drainage or visual changes.  ENT: No ear pain or discharge, nasal discharge or congestion. No sore throat  CARD: No chest pain, palpitations  RESP: No SOB, cough, hemoptysis. No hx of asthma or COPD  GI: No abdominal pain, N/V/D  : No urinary symptoms. (+) penile drainage.   MS: No joint pain, back pain or extremity pain/injury  SKIN: No rashes  NEURO: No headache, dizziness, paresthesias or LOC

## 2021-03-12 NOTE — ED ADULT NURSE NOTE - EXTENSIONS OF SELF_ADULT
Dear Mary Jane,     -Cholesterol levels (LDL,HDL, Triglycerides) are normal.  ADVISE: rechecking in 1 year.   -Glucose (diabetic screening test) is normal.      Please send a [employer] message or call [phone]  if you have any questions.      Hillary Gilbert, SHERRELL, CNP  [facility]    If you have further questions about the interpretation of your labs, [email or web address] is a good website to check out for further information.     None

## 2021-03-13 LAB
CULTURE RESULTS: NO GROWTH — SIGNIFICANT CHANGE UP
SPECIMEN SOURCE: SIGNIFICANT CHANGE UP
T PALLIDUM AB TITR SER: NEGATIVE — SIGNIFICANT CHANGE UP

## 2021-03-15 LAB
C TRACH RRNA SPEC QL NAA+PROBE: SIGNIFICANT CHANGE UP
N GONORRHOEA RRNA SPEC QL NAA+PROBE: SIGNIFICANT CHANGE UP
SPECIMEN SOURCE: SIGNIFICANT CHANGE UP

## 2021-04-03 ENCOUNTER — EMERGENCY (EMERGENCY)
Facility: HOSPITAL | Age: 36
LOS: 0 days | Discharge: HOME | End: 2021-04-04
Attending: EMERGENCY MEDICINE | Admitting: EMERGENCY MEDICINE
Payer: MEDICAID

## 2021-04-03 VITALS — WEIGHT: 177.91 LBS | HEIGHT: 72 IN

## 2021-04-03 DIAGNOSIS — N34.2 OTHER URETHRITIS: ICD-10-CM

## 2021-04-03 DIAGNOSIS — Z11.3 ENCOUNTER FOR SCREENING FOR INFECTIONS WITH A PREDOMINANTLY SEXUAL MODE OF TRANSMISSION: ICD-10-CM

## 2021-04-03 PROCEDURE — 99283 EMERGENCY DEPT VISIT LOW MDM: CPT

## 2021-04-03 NOTE — ED ADULT NURSE NOTE - IS THE PATIENT ABLE TO BE SCREENED?
Digestive health EGD 4/24/1220:  -Impression:  -LA grade a reflux esophagitis  -Single gastric polyps, biopsy  - Otherwise normal  -Continue medications   Yes

## 2021-04-04 VITALS
HEART RATE: 98 BPM | OXYGEN SATURATION: 98 % | DIASTOLIC BLOOD PRESSURE: 75 MMHG | SYSTOLIC BLOOD PRESSURE: 125 MMHG | RESPIRATION RATE: 18 BRPM | TEMPERATURE: 99 F

## 2021-04-04 LAB
APPEARANCE UR: ABNORMAL
BACTERIA # UR AUTO: ABNORMAL
BILIRUB UR-MCNC: NEGATIVE — SIGNIFICANT CHANGE UP
COLOR SPEC: SIGNIFICANT CHANGE UP
COMMENT - URINE: SIGNIFICANT CHANGE UP
DIFF PNL FLD: NEGATIVE — SIGNIFICANT CHANGE UP
EPI CELLS # UR: ABNORMAL /HPF
GLUCOSE UR QL: NEGATIVE MG/DL — SIGNIFICANT CHANGE UP
KETONES UR-MCNC: ABNORMAL
LEUKOCYTE ESTERASE UR-ACNC: ABNORMAL
NITRITE UR-MCNC: NEGATIVE — SIGNIFICANT CHANGE UP
PH UR: 5.5 — SIGNIFICANT CHANGE UP (ref 5–8)
PROT UR-MCNC: NEGATIVE MG/DL — SIGNIFICANT CHANGE UP
SP GR SPEC: >=1.03 (ref 1.01–1.03)
UROBILINOGEN FLD QL: 0.2 MG/DL — SIGNIFICANT CHANGE UP (ref 0.2–0.2)
WBC UR QL: ABNORMAL /HPF

## 2021-04-04 RX ORDER — AZITHROMYCIN 500 MG/1
1 TABLET, FILM COATED ORAL ONCE
Refills: 0 | Status: COMPLETED | OUTPATIENT
Start: 2021-04-04 | End: 2021-04-04

## 2021-04-04 RX ADMIN — AZITHROMYCIN 1 GRAM(S): 500 TABLET, FILM COATED ORAL at 00:45

## 2021-04-04 NOTE — ED PROVIDER NOTE - PATIENT PORTAL LINK FT
You can access the FollowMyHealth Patient Portal offered by [facility] by registering at the following website: [email or web address]. By joining [employer]’s FollowMyHealth portal, you will also be able to view your health information using other applications (apps) compatible with our system.

## 2021-04-04 NOTE — ED PROVIDER NOTE - NSFOLLOWUPCLINICS_GEN_ALL_ED_FT
[facility]  Urology  .  [place]   Phone: [phone]  Fax:   Follow Up Time: 1-3 Days

## 2021-04-04 NOTE — ED PROVIDER NOTE - PHYSICAL EXAMINATION
Physical Exam    Vital Signs: I have reviewed the initial vital signs.  Constitutional: well-nourished, appears stated age, no acute distress  Eyes: Conjunctiva pink, Sclera clear  Cardiovascular: S1 and S2, regular rate, regular rhythm, well-perfused extremities, radial pulses equal and 2+ b/l.   Respiratory: unlabored respiratory effort, clear to auscultation bilaterally no wheezing, rales and rhonchi. pt is speaking full sentences. no accessory muscle use.   Gastrointestinal: soft, non-tender, nondistended abdomen, no pulsatile mass, normal bowl sounds, no rebound, no guarding, negative psoas, negative obturator, negative murphys. no organomegaly. no cva tenderness.   Genitourinary: exam chaperoned by PCA. no drainage from the urethral meatus. no genital lesions or rashes. no lad. no testicular tenderness, erythema, or edema.   Musculoskeletal: supple neck, no lower extremity edema, no calf tenderness, no midline tenderness, no palpable spinal step offs  Integumentary: warm, dry, no rash  Neurologic: awake, alert  Psychiatric: appropriate mood, appropriate affect

## 2021-04-04 NOTE — ED PROVIDER NOTE - CLINICAL SUMMARY MEDICAL DECISION MAKING FREE TEXT BOX
29 male with urethritis. Had recent workup with no acute findings, states symptoms persisted. Chart review performed, will modify Rx and continue ABX for 2 week course with outpatient followup and return and follow up instructions.

## 2021-04-04 NOTE — ED PROVIDER NOTE - OBJECTIVE STATEMENT
35 y/o male with no significant PMH presents to the ED for evaluation of pus draining from the penis predominantly in the morning. pt was seen in the ED 3/12/21 and tested negative for trich, gc/chlamydia and HIV. pt ppx tx with rocephin and a course of doxycycline.  pt reports he had an std about 15 years ago and was given abx for it. pt reports his partner was also tested for stds several weeks ago and ppx tx. pt denies urinary symptoms, blood in the urine, abdominal pain, fever, chlls, n/v/d/c, back pain, or genital rashes.

## 2021-04-04 NOTE — ED PROVIDER NOTE - ATTENDING CONTRIBUTION TO CARE
I personally evaluated the patient. I reviewed the Resident’s or Physician Assistant’s note (as assigned above), and agree with the findings and plan except as documented in my note.    36 male here for persistent urethral discharge. Had recent workup for similar with STI testing and no findings. States greenish discharge today without  anomalies.     ROS otherwise unremarkable    PE: male in no distress. CV: pulses intact. CHEST: normal work of breathing. ABD: nondistended. SKIN: normal. : no chancre. no discharge from meatus. EXT: FROM. NEURO: AAO 3 no focal deficits.     Impression: urethritis    Plan: labs Rx supportive care and reevaluation

## 2021-04-04 NOTE — ED PROVIDER NOTE - NSFOLLOWUPINSTRUCTIONS_ED_ALL_ED_FT
[employer] CareNotes®     :  [facility]             SEXUALLY TRANSMITTED DISEASES - General Information     Sexually Transmitted Diseases    WHAT YOU NEED TO KNOW:    What is a sexually transmitted disease? A sexually transmitted disease (STD), is also called a sexually transmitted infection (STI). An STD is an infection caused by bacteria or a virus. STDs are spread by oral, genital, or anal sex. Some examples of STDs are HIV, chlamydia, syphilis, and gonorrhea.    What increases my risk for an STD?     Unprotected sex      Being female      Illegal IV drug use      Multiple partners      Not vaccinated      A weak immune system    What are the signs and symptoms of an STD? You may have one or more of the following depending on the STD you have:     Blisters, warts, sores, or a rash on your skin that may be painful      Discharge from the penis, vagina, or anus that may have a foul smell      Fever, muscle pain, or swollen lymph nodes in the groin      Inflammation and itching of the skin      Pelvic or abdominal pain, or pain during sex or when urinating      Sore throat, mouth ulcers, or trouble swallowing      Vaginal bleeding or spotting after sex in females    How is an STD diagnosed? Your healthcare provider will examine you and ask about your symptoms. He may ask you about your sexual history or other medical conditions. He will ask you if you have had an STD before. If you are a woman, you may need a pelvic exam to check your vagina, cervix, and other organs. You may also need any of the following:     Blood and urine tests may show an infection and what kind it is.      A sample of discharge may show what is causing your STD.    How is an STD treated? Treatment depends on the STD you have. You may need medicines to treat the infection.    How can I help prevent an STD? Ask your healthcare provider for more information about the following safe sex practices:    Use condoms. Use a latex condom if you have oral, genital, or anal sex. Use a new condom each time. Use a polyurethane condom if you are allergic to latex.      Do not douche. Douching upsets the normal balance of bacteria are found in your vagina. It does not prevent or clear up vaginal infections.      Do not have sex with someone who has an STD. This includes oral and anal sex.      Limit sexual partners. Have sex with one person who is not having sex with anyone else.       Do not have sex during treatment. Do not have sex while you or your partners are being treated for an STD.      Get screening tests regularly if you are sexually active.      Get vaccinated. Vaccines may help to prevent your risk of some STDs. Ask your healthcare provider for more information about vaccines for STDs.    When should I seek immediate care?     You have genital swelling or pain, or unusual bleeding.      You have joint pain, rash, swollen lymph nodes, or night sweats.      You have severe abdominal pain.    When should I contact my healthcare provider?     You have a fever.       Your symptoms do not go away or they get worse, even after treatment.      You have bleeding or pain during sex.      You have questions or concerns about your condition or care.    CARE AGREEMENT:    You have the right to help plan your care. Learn about your health condition and how it may be treated. Discuss treatment options with your healthcare providers to decide what care you want to receive. You always have the right to refuse treatment.        © Copyright [employer] 2019       back to top                      © Copyright [employer] 2019

## 2021-04-04 NOTE — ED PROVIDER NOTE - PROGRESS NOTE DETAILS
FF: pt given another course of doxy. pt tested for stds. advised of return precautions. advised to f/u with urology. agreeable to dc.

## 2021-04-05 LAB
C TRACH RRNA SPEC QL NAA+PROBE: SIGNIFICANT CHANGE UP
CULTURE RESULTS: NO GROWTH — SIGNIFICANT CHANGE UP
N GONORRHOEA RRNA SPEC QL NAA+PROBE: SIGNIFICANT CHANGE UP
SPECIMEN SOURCE: SIGNIFICANT CHANGE UP
SPECIMEN SOURCE: SIGNIFICANT CHANGE UP

## 2021-08-30 ENCOUNTER — TRANSCRIPTION ENCOUNTER (OUTPATIENT)
Age: 36
End: 2021-08-30

## 2021-09-07 ENCOUNTER — EMERGENCY (EMERGENCY)
Facility: HOSPITAL | Age: 36
LOS: 0 days | Discharge: HOME | End: 2021-09-07
Attending: EMERGENCY MEDICINE | Admitting: EMERGENCY MEDICINE
Payer: MEDICAID

## 2021-09-07 VITALS
OXYGEN SATURATION: 99 % | HEART RATE: 76 BPM | RESPIRATION RATE: 18 BRPM | WEIGHT: 175.05 LBS | TEMPERATURE: 96 F | DIASTOLIC BLOOD PRESSURE: 89 MMHG | SYSTOLIC BLOOD PRESSURE: 148 MMHG | HEIGHT: 72 IN

## 2021-09-07 VITALS — HEIGHT: 72 IN | WEIGHT: 169.98 LBS

## 2021-09-07 DIAGNOSIS — F17.200 NICOTINE DEPENDENCE, UNSPECIFIED, UNCOMPLICATED: ICD-10-CM

## 2021-09-07 DIAGNOSIS — R36.9 URETHRAL DISCHARGE, UNSPECIFIED: ICD-10-CM

## 2021-09-07 DIAGNOSIS — Z20.2 CONTACT WITH AND (SUSPECTED) EXPOSURE TO INFECTIONS WITH A PREDOMINANTLY SEXUAL MODE OF TRANSMISSION: ICD-10-CM

## 2021-09-07 PROCEDURE — 99284 EMERGENCY DEPT VISIT MOD MDM: CPT

## 2021-09-07 RX ORDER — CEFTRIAXONE 500 MG/1
500 INJECTION, POWDER, FOR SOLUTION INTRAMUSCULAR; INTRAVENOUS ONCE
Refills: 0 | Status: COMPLETED | OUTPATIENT
Start: 2021-09-07 | End: 2021-09-07

## 2021-09-07 RX ADMIN — Medication 100 MILLIGRAM(S): at 11:31

## 2021-09-07 RX ADMIN — CEFTRIAXONE 500 MILLIGRAM(S): 500 INJECTION, POWDER, FOR SOLUTION INTRAMUSCULAR; INTRAVENOUS at 11:32

## 2021-09-07 NOTE — ED PROVIDER NOTE - PHYSICAL EXAMINATION
CONSTITUTIONAL: Well-developed; well-nourished; in no acute distress.   SKIN: warm, dry  HEAD: Normocephalic  EYES: no conjunctival injection  ENT: No nasal discharge  NECK: Supple  ABD: soft ntnd. BS+ in all 4 quadrants.  EXT: Normal ROM.  No clubbing, cyanosis or edema.   NEURO: Alert, oriented, grossly unremarkable.  PSYCH: Cooperative, appropriate.

## 2021-09-07 NOTE — ED PROVIDER NOTE - CARE PROVIDER_API CALL
Lia Cornell  INTERNAL MEDICINE  [address] [place] [address]  Phone: [phone]  Fax: [phone]  Follow Up Time: 1-3 Days

## 2021-09-07 NOTE — ED PROVIDER NOTE - OBJECTIVE STATEMENT
35 yo male, no PMHx, presents with constant white penile discharge for a few weeks. Patient states he was treated prophylactically in the past for STD and would like that today. He is sexually active with one female partner, does not use protection, same partner from last episode. Denies fevers, chills, dysuria, penile or testicular pain.

## 2021-09-07 NOTE — ED PROVIDER NOTE - PATIENT PORTAL LINK FT
You can access the FollowMyHealth Patient Portal offered by [facility] by registering at the following website: [email or web address]. By joining [employer]’s FollowMyHealth portal, you will also be able to view your health information using other applications (apps) compatible with our system.

## 2021-09-07 NOTE — ED PROVIDER NOTE - NSFOLLOWUPINSTRUCTIONS_ED_ALL_ED_FT
Safe Sex Practices    WHAT YOU NEED TO KNOW:    What are safe sex practices? Safe sex practices are ways to prevent pregnancy and the spread of sexually transmitted infections (STIs). An STI happens when a virus or bacteria are spread through sexual activity. Safe sex practices help decrease or prevent body fluid exchange during sex. Body fluids include saliva, urine, blood, vaginal fluids, and semen. Oral, vaginal, and anal sex can all spread STIs.    What are some safe sex practices to follow before I have sex?   •Talk to a new partner before you have sex. Tell your partner if you have an STI. Ask about his or her sex history and if he or she has a current or past STI. Your partner may need to be tested and treated. Do not have sex while you are being treated for an STI, or with a partner who is being treated.      •Limit your number of sex partners. More than one sex partner can increase your risk for an STI. Do not have sex with anyone whose sex history you do not know.      •Get tested for STIs if needed. Get tested if you had sex with someone who has an STI. Get tested if you have unprotected sex with any new partner.      •Talk to your healthcare provider about birth control. Birth control can help prevent an unwanted pregnancy. There are many different types of birth control. Talk to your healthcare provider about which birth control method is right for you.      •Ask about medicines to lower your risk for some STIs: ?Vaccines can help protect you from hepatitis A, hepatitis B, and the human papillomavirus (HPV). The HPV vaccine is usually given at 11 years, but it may be given through 26 years to both females and males. Your provider can give you more information on vaccines to prevent STIs.      ?Pre-exposure prophylaxis (PrEP) may be given if you are at high risk for HIV. PrEP is taken every day to prevent the virus from fully infecting the body.      •Do not use alcohol or drugs before sex. These can prevent you from thinking clearly and increase your risk for unsafe sex.      What are some safe sex practices to follow while I am having sex?   •Use condoms and barrier methods for all types of sexual contact. This includes oral, vaginal, and anal sex. Male and female condoms are available. Make sure that the condom fits and is put on correctly. Rubber latex sheets or dental dams can be used for oral sex. Use a new condom or latex barrier each time you have sex. Use latex condoms, if possible. [place] or natural condoms do not prevent STIs. If you or your partner is allergic to latex, use a nonlatex product, such as polyurethane. Use a second form of birth control with the condom to prevent pregnancy and STIs. Do not use male and female condoms together.      •Only use water-based lubricants during sex. Water-based lubricants help prevent sores or cuts in the vagina or on the penis. Prevent sores or cuts to decrease your risk for an STI. Do not use oil-based lubricants, such as baby oil or hand lotion, with latex condoms or barriers. These will weaken the latex and may cause the condom to break.      •Do not use chemicals that irritate your skin. Products that contain chemical irritants, such as spermicides, can irritate the lining of your vagina or rectum. Irritation may cause sores that can increase your risk for an STI.      •Be careful when you have sex if you have open sores or cuts. Open sores or cuts may increase your risk for an STI. Keep all open sores or cuts covered during sex. Do not have oral sex if you have cuts or sores in your mouth.      •Do not do activities that can pass germs. Do not use saliva as a lubricant or share sex toys.      Where can I find more information?   •[employer] (MARGIE)  [address] [address]  [place] [address]  Web Address: http://www.ashastd.org        When should I seek immediate care?   •A condom breaks, leaks, or slips off while you are having sex.      •You notice sores on your penis, vagina, anal area, or the skin around them.      •You have had unsafe sex and want to discuss emergency contraception or treatment for STI exposure.      When should I call my doctor?   •You think you or your female sex partner might be pregnant.      •You have questions or concerns about your condition or care.      CARE AGREEMENT:    You have the right to help plan your care. Learn about your health condition and how it may be treated. Discuss treatment options with your healthcare providers to decide what care you want to receive. You always have the right to refuse treatment.

## 2021-09-07 NOTE — ED ADULT NURSE NOTE - NSIMPLEMENTINTERV_GEN_ALL_ED
Implemented All Universal Safety Interventions:  [place] to call system. Call bell, personal items and telephone within reach. Instruct patient to call for assistance. Room bathroom lighting operational. Non-slip footwear when patient is off stretcher. Physically safe environment: no spills, clutter or unnecessary equipment. Stretcher in lowest position, wheels locked, appropriate side rails in place.

## 2021-09-07 NOTE — ED PROVIDER NOTE - NS ED ROS FT
GEN:  no fever, no chills, no generalized weakness  ENT: no sore throat  GI:  no nausea, no vomiting, no abdominal pain  :  +white penile discharge, no dysuria, no urinary frequency, no hematuria  MSK:  no joint pain, no edema  SKIN:  no rash, no bruising
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
## 2021-11-17 ENCOUNTER — EMERGENCY (EMERGENCY)
Facility: HOSPITAL | Age: 36
LOS: 0 days | Discharge: HOME | End: 2021-11-17
Attending: EMERGENCY MEDICINE | Admitting: EMERGENCY MEDICINE
Payer: MEDICAID

## 2021-11-17 VITALS
WEIGHT: 175.05 LBS | TEMPERATURE: 98 F | SYSTOLIC BLOOD PRESSURE: 143 MMHG | OXYGEN SATURATION: 97 % | HEIGHT: 72 IN | DIASTOLIC BLOOD PRESSURE: 97 MMHG | HEART RATE: 107 BPM | RESPIRATION RATE: 20 BRPM

## 2021-11-17 DIAGNOSIS — N34.2 OTHER URETHRITIS: ICD-10-CM

## 2021-11-17 DIAGNOSIS — Z11.3 ENCOUNTER FOR SCREENING FOR INFECTIONS WITH A PREDOMINANTLY SEXUAL MODE OF TRANSMISSION: ICD-10-CM

## 2021-11-17 DIAGNOSIS — R30.0 DYSURIA: ICD-10-CM

## 2021-11-17 DIAGNOSIS — A64 UNSPECIFIED SEXUALLY TRANSMITTED DISEASE: ICD-10-CM

## 2021-11-17 LAB
C TRACH RRNA SPEC QL NAA+PROBE: SIGNIFICANT CHANGE UP
HIV 1 & 2 AB SERPL IA.RAPID: SIGNIFICANT CHANGE UP
N GONORRHOEA RRNA SPEC QL NAA+PROBE: SIGNIFICANT CHANGE UP
SPECIMEN SOURCE: SIGNIFICANT CHANGE UP

## 2021-11-17 PROCEDURE — 99284 EMERGENCY DEPT VISIT MOD MDM: CPT

## 2021-11-17 RX ORDER — CEFTRIAXONE 500 MG/1
500 INJECTION, POWDER, FOR SOLUTION INTRAMUSCULAR; INTRAVENOUS ONCE
Refills: 0 | Status: COMPLETED | OUTPATIENT
Start: 2021-11-17 | End: 2021-11-17

## 2021-11-17 RX ORDER — AZITHROMYCIN 500 MG/1
1 TABLET, FILM COATED ORAL ONCE
Refills: 0 | Status: COMPLETED | OUTPATIENT
Start: 2021-11-17 | End: 2021-11-17

## 2021-11-17 RX ADMIN — CEFTRIAXONE 500 MILLIGRAM(S): 500 INJECTION, POWDER, FOR SOLUTION INTRAMUSCULAR; INTRAVENOUS at 05:00

## 2021-11-17 RX ADMIN — AZITHROMYCIN 1 GRAM(S): 500 TABLET, FILM COATED ORAL at 05:00

## 2021-11-17 NOTE — ED PROVIDER NOTE - PHYSICAL EXAMINATION
Physical Exam    Vital Signs: I have reviewed the initial vital signs.  Constitutional: well-nourished, appears stated age, no acute distress  Gastrointestinal: soft, non-tender abdomen, no pulsatile mass, normal bowl sounds  : refused   Musculoskeletal: supple neck, no lower extremity edema, no midline tenderness  Integumentary: warm, dry, no rash  Neurologic: awake, alert, extremities’ motor and sensory functions grossly intact

## 2021-11-17 NOTE — ED ADULT NURSE NOTE - NSFALLRSKPASTHIST_ED_ALL_ED
Addended by: Daljit Tapia on: 7/17/2020 01:44 PM     Modules accepted: Orders
Addended by: Vitor Hamilton on: 7/17/2020 01:20 PM     Modules accepted: Orders
no

## 2021-11-17 NOTE — ED PROVIDER NOTE - CLINICAL SUMMARY MEDICAL DECISION MAKING FREE TEXT BOX
36yM  pw  penile discharge  STD exposure -  refusing   exam in ED.  ABx given  -  outpt medicine clinic for  follow up results

## 2021-11-17 NOTE — ED PROVIDER NOTE - NSFOLLOWUPINSTRUCTIONS_ED_ALL_ED_FT
Please call today to medicine clinic for appointment for testing results done today.    Nonspecific Urethritis in Men    WHAT YOU NEED TO KNOW:    Nonspecific urethritis is a condition that causes inflammation of the urethra. The urethra is the tube where urine passes from the bladder to the outside of the body. Men who have sex with men and those with multiple sexual partners are at a high risk of having this condition.Male Reproductive System         DISCHARGE INSTRUCTIONS:    Medicines:     Antibiotics: This medicine is used to treat an infection caused by bacteria. Take them as directed.      Take your medicine as directed. Contact your healthcare provider if you think your medicine is not helping or if you have side effects. Tell him of her if you are allergic to any medicine. Keep a list of the medicines, vitamins, and herbs you take. Include the amounts, and when and why you take them. Bring the list or the pill bottles to follow-up visits. Carry your medicine list with you in case of an emergency.    Follow up with your healthcare provider as directed: Write down your questions so you remember to ask them during your visits.     Manage your symptoms:     Heat: Sit in a warm bath for 15 minutes at least 2 times a day.      Do not use chemical irritants: This includes bath soaps, spermicides, or other products that may cause irritation.    Prevent nonspecific urethritis: If your urethritis was caused by an infection, the following may help to prevent the spread:     Use condoms: Wear a condom during oral, vaginal, and anal sex. Ask for more information about the correct way to use condoms.      Do not have sex with someone who has urethritis: This includes oral, vaginal, and anal sex.       Do not have sex during treatment: Do not have sex while you or your partners are being treated. Ask when it is safe to have sex.       Report pregnancy: Tell your healthcare provider if your female partner is pregnant. You may have spread an infection to her, and she may pass it to her infant during birth.    Contact your healthcare provider if:     You have a fever.       You have chills, a cough, or feel weak and achy.      You continue to have signs or symptoms after being treated for nonspecific urethritis.      You have questions or concerns about your condition or care.    Return to the emergency department if:     You have joint stiffness, muscle pain, or eye inflammation.      You have pain and swelling in your scrotum.      You have severe abdominal pain.      You have chest pain or trouble breathing.         © Copyright [employer] 2019 All illustrations and images included in CareNotes are the copyrighted property of [employer].Related Content Database (RCDb).[employer]. or [employer].        [employer] CareNotes®     :  [facility]             SEXUALLY TRANSMITTED DISEASES - General Information     Sexually Transmitted Diseases    WHAT YOU NEED TO KNOW:    What is a sexually transmitted disease? A sexually transmitted disease (STD), is also called a sexually transmitted infection (STI). An STD is an infection caused by bacteria or a virus. STDs are spread by oral, genital, or anal sex. Some examples of STDs are HIV, chlamydia, syphilis, and gonorrhea.    What increases my risk for an STD?     Unprotected sex      Being female      Illegal IV drug use      Multiple partners      Not vaccinated      A weak immune system    What are the signs and symptoms of an STD? You may have one or more of the following depending on the STD you have:     Blisters, warts, sores, or a rash on your skin that may be painful      Discharge from the penis, vagina, or anus that may have a foul smell      Fever, muscle pain, or swollen lymph nodes in the groin      Inflammation and itching of the skin      Pelvic or abdominal pain, or pain during sex or when urinating      Sore throat, mouth ulcers, or trouble swallowing      Vaginal bleeding or spotting after sex in females    How is an STD diagnosed? Your healthcare provider will examine you and ask about your symptoms. He may ask you about your sexual history or other medical conditions. He will ask you if you have had an STD before. If you are a woman, you may need a pelvic exam to check your vagina, cervix, and other organs. You may also need any of the following:     Blood and urine tests may show an infection and what kind it is.      A sample of discharge may show what is causing your STD.    How is an STD treated? Treatment depends on the STD you have. You may need medicines to treat the infection.    How can I help prevent an STD? Ask your healthcare provider for more information about the following safe sex practices:    Use condoms. Use a latex condom if you have oral, genital, or anal sex. Use a new condom each time. Use a polyurethane condom if you are allergic to latex.      Do not douche. Douching upsets the normal balance of bacteria are found in your vagina. It does not prevent or clear up vaginal infections.      Do not have sex with someone who has an STD. This includes oral and anal sex.      Limit sexual partners. Have sex with one person who is not having sex with anyone else.       Do not have sex during treatment. Do not have sex while you or your partners are being treated for an STD.      Get screening tests regularly if you are sexually active.      Get vaccinated. Vaccines may help to prevent your risk of some STDs. Ask your healthcare provider for more information about vaccines for STDs.    When should I seek immediate care?     You have genital swelling or pain, or unusual bleeding.      You have joint pain, rash, swollen lymph nodes, or night sweats.      You have severe abdominal pain.    When should I contact my healthcare provider?     You have a fever.       Your symptoms do not go away or they get worse, even after treatment.      You have bleeding or pain during sex.      You have questions or concerns about your condition or care.    CARE AGREEMENT:    You have the right to help plan your care. Learn about your health condition and how it may be treated. Discuss treatment options with your healthcare providers to decide what care you want to receive. You always have the right to refuse treatment.        © Copyright [employer] 2019       back to top                      © Copyright [employer] 2019

## 2021-11-17 NOTE — ED PROVIDER NOTE - PATIENT PORTAL LINK FT
You can access the FollowMyHealth Patient Portal offered by [facility] by registering at the following website: [email or web address]. By joining [employer]’s FollowMyHealth portal, you will also be able to view your health information using other applications (apps) compatible with our system. 4
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
## 2021-11-17 NOTE — ED PROVIDER NOTE - OBJECTIVE STATEMENT
36 year old male comes to emergency room for burning on urination and discharge for the last 3 weeks worse tonight. patient history of STDs in the past and states has another one.

## 2021-11-18 LAB — HIV 1+2 AB+HIV1 P24 AG SERPL QL IA: SIGNIFICANT CHANGE UP

## 2022-03-22 NOTE — ED ADULT TRIAGE NOTE - IDEAL BODY WEIGHT(KG)
[facility]  Cardiology Progress Note  Date of Service: 03/22/2022      Assessment & Plan    Jaqueline Canales is a 83 year old female admitted on 3/18/2022 with acute diastolic HF exacerbation.     Assessment:  1.  Acute on chronic heart failure with preserved ejection fraction - LVEF 60-65% with mildly decreased RV systolic function, no significant change in NTproBNP from 5,818 on admission -> 5,482 today.    - PTA diuretics torsemide 20mg BID  2. Bilateral LE edema - improving  3. Pulmonary hypertension - likely mixed etiology   4. Moderate to severe tricuspid regurgitation  5. Moderate to severe mitral valve regurgitation  6. Moderate aortic stenosis  7. Chronic atrial fibrillation - elevated heart rates 100-140 bpm.    - On eliquis for thromboembolic prophylaxis  8. Iron deficiency anemia  9. Hyperlipidemia  10. Scleroderma  11. Rheumatoid arthritis    Patient seen at bedside. She feels breathing back to baseline. She appears close to euvolemic on exam with trace bilateral LE edema. 2.3L out yesterday, unclear on accuracy of hospital weight, no weight today. Unclear goal weight, she has had a steady decline in her weights since 1/2020 at which time her weight was 185#. BMP stable. No significant change in NTproBNP from 5,818 on admission -> 5,482 yesterday. Changed to PO diuretics yesterday.     Regarding her atrial fibrillation her heart rates have steadily increased and now ranging 100-140bpm, asymptomatic. This is likely secondary to being of her metoprolol XL. Blood pressures soft. Reading this morning reported 82/49, bedside nurse shares this was taken standing with automatic machine. Given her atrial fibrillation automatic blood pressure machine likely reading blood pressure slightly inaccurate. Blood pressures have been ranging  systolically. Given soft blood pressures recommend starting digoxin to help with rate control. Considered amiodarone but given her lung history  with pulmonary fibrosis noted on CT will avoid this.     She is scheduled for close follow up with [facility] clinic on 3/28.    Plan:   1. START digoxin 250mcg IV once now and then daily digoxin 125mcg PO starting tomorrow.   2. Continue apixaban 2.5mg BID  3. Continue torsemide 20mg BID  4. Continue sildenafil 20mg TID   5. Metoprolol XL 25mg and nifedipine ER 60mg on hold due to hypotension. Okay to remain off at discharge as blood pressures remain soft. This can be further evaluated outpatient if they need to be added back on.   6. Daily weight, strict I&O, low sodium diet and 2L fluid restriction  7. Follow up with Martha Gant PA-C with [facility] 3/28/22      AUBRIE [facility]  Pager: [phone]    This assessment and plan formulated under the guidance of Dr. Mc.     I spent > 15 minutes face-to-face and/or coordinating care. Over 50% of our time on the unit was spent counseling the patient and/or coordinating care.      Interval History   No acute events overnight. Feels breathing is back to baseline. Lower extremity edema improving. Denies chest pain. Denies dizziness/lightheadednesss. Denies palpitations or tachycardia.     Bedside nurse present and shares BP of 82/49 taken standing.     Physical Exam   Temp: 98.2  F (36.8  C) Temp src: Oral BP: (!) 82/49 Pulse: (!) 122   Resp: 16 SpO2: 94 % O2 Device: Nasal cannula Oxygen Delivery: 2 LPM  Vitals:    03/20/22 0717 03/21/22 0556 03/21/22 0827   Weight: 61.9 kg (136 lb 7.4 oz) 57.7 kg (127 lb 3.2 oz) 58.7 kg (129 lb 8 oz)   GEN: well nourished, in no acute distress.  HEENT:  Pupils equal, round. Sclerae nonicteric.   NECK: Supple, no masses appreciated. JVP appears normal  C/V:  Irregularly irregular rhythm, tachycardic   RESP: Respirations are unlabored. Clear to auscultation bilaterally, faint crackles in bilateral bases.   GI: Abdomen soft, nontender.  EXTREM: Trace bilateral LE edema.  NEURO: Alert and oriented,  cooperative.  SKIN: Warm and dry    Medications     - MEDICATION INSTRUCTIONS -       - MEDICATION INSTRUCTIONS -       ACE/ARB/ARNI NOT PRESCRIBED         apixaban ANTICOAGULANT  2.5 mg Oral BID     digoxin  250 mcg Intravenous Once     [START ON 3/23/2022] digoxin  125 mcg Oral Daily     donepezil  5 mg Oral At Bedtime     folic acid  1,000 mcg Oral Daily     gabapentin  100 mg Oral TID     hydroxychloroquine  200 mg Oral Daily     levothyroxine  75 mcg Oral Daily     [Held by provider] methotrexate  12.5 mg Oral 2 times per day on Wed     [Held by provider] metoprolol succinate ER  25 mg Oral Daily     [Held by provider] NIFEdipine ER  60 mg Oral Daily     pantoprazole  40 mg Oral BID AC     potassium chloride  20 mEq Oral Daily     salicylic acid   Topical Daily     sildenafil  20 mg Oral TID     simvastatin  40 mg Oral At Bedtime     sodium chloride (PF)  3 mL Intracatheter Q8H     torsemide  20 mg Oral BID       Data   Last 24 hours labs reviewed     Imaging: Lower extremity US 3/18/22  No deep venous thrombosis in the bilateral lower extremities.    Tele: atrial fibrillation, tachycardic     Echo: 3/19/22  The rhythm was atrial fibrillation.  Left ventricular systolic function is normal.  The visual ejection fraction is 60-65%.  Mildly decreased right ventricular systolic function  The right ventricle is moderately dilated.  The left ventricle is normal in size.  There is severe biatrial enlargement.  There is mod-severe to severe (3-4+) tricuspid regurgitation.  Right ventricular systolic pressure is elevated, consistent with moderate to  severe pulmonary hypertension.  There is moderate to mod-severe (2-3+) mitral regurgitation.ERO 0.26 cm2/ RV  38 ml)  Moderate valvular aortic stenosis JACOB 1.1cm2/MSG 20 mmHg/DI 0.34     78

## 2023-02-24 ENCOUNTER — EMERGENCY (EMERGENCY)
Facility: HOSPITAL | Age: 38
LOS: 0 days | Discharge: HOME | End: 2023-02-24
Attending: EMERGENCY MEDICINE
Payer: MEDICAID

## 2023-02-24 VITALS — HEART RATE: 98 BPM

## 2023-02-24 VITALS
HEART RATE: 129 BPM | WEIGHT: 169.98 LBS | RESPIRATION RATE: 18 BRPM | SYSTOLIC BLOOD PRESSURE: 162 MMHG | TEMPERATURE: 98 F | OXYGEN SATURATION: 96 % | DIASTOLIC BLOOD PRESSURE: 72 MMHG
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
## 2023-02-24 DIAGNOSIS — S01.21XA LACERATION WITHOUT FOREIGN BODY OF NOSE, INITIAL ENCOUNTER: ICD-10-CM

## 2023-02-24 DIAGNOSIS — S02.2XXA FRACTURE OF NASAL BONES, INITIAL ENCOUNTER FOR CLOSED FRACTURE: ICD-10-CM

## 2023-02-24 PROCEDURE — 99284 EMERGENCY DEPT VISIT MOD MDM: CPT

## 2023-02-24 PROCEDURE — 70160 X-RAY EXAM OF NASAL BONES: CPT

## 2023-02-24 PROCEDURE — 70160 X-RAY EXAM OF NASAL BONES: CPT | Mod: 26

## 2023-02-24 PROCEDURE — 99283 EMERGENCY DEPT VISIT LOW MDM: CPT

## 2023-02-24 NOTE — ED PROVIDER NOTE - PATIENT PORTAL LINK FT
You can access the FollowMyHealth Patient Portal offered by [facility] by registering at the following website: [email or web address]. By joining [facility]’s FollowMyHealth portal, you will also be able to view your health information using other applications (apps) compatible with our system.

## 2023-02-24 NOTE — ED PROVIDER NOTE - NS ED ATTENDING STATEMENT MOD
This was a shared visit with the CARLTON. I reviewed and verified the documentation and independently performed the documented:

## 2023-02-24 NOTE — ED PROVIDER NOTE - NSFOLLOWUPCLINICS_GEN_ALL_ED_FT
[facility]  ENT  [address], [address] [place] [address]  Phone: [phone]  Fax:

## 2023-02-24 NOTE — ED PROVIDER NOTE - ENMT, MLM
swelling and tenderness to nose, Minor abrasion to forehead, 0.5 cm, no sutures needed, No septal  hematoma,
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
## 2023-02-24 NOTE — ED PROVIDER NOTE - ATTENDING APP SHARED VISIT CONTRIBUTION OF CARE
Nasal fracture with noncommunicating superficial laceration as above, no septal hematoma, wound clean, will discharge supportive care

## 2023-05-12 NOTE — ED ADULT NURSE NOTE - NS ED NURSE LEVEL OF CONSCIOUSNESS MENTAL STATUS
The site was marked. Prepped: right neck. Prepped with: ChloraPrep. The patient was draped. Alert/Awake/Cooperative

## 2023-05-15 ENCOUNTER — EMERGENCY (EMERGENCY)
Facility: HOSPITAL | Age: 38
LOS: 0 days | Discharge: HOME | End: 2023-05-16
Attending: STUDENT IN AN ORGANIZED HEALTH CARE EDUCATION/TRAINING PROGRAM
Payer: MEDICAID

## 2023-05-15 VITALS
RESPIRATION RATE: 17 BRPM | DIASTOLIC BLOOD PRESSURE: 83 MMHG | SYSTOLIC BLOOD PRESSURE: 142 MMHG | OXYGEN SATURATION: 98 % | TEMPERATURE: 99 F | HEART RATE: 86 BPM | WEIGHT: 171.08 LBS

## 2023-05-15 DIAGNOSIS — S62.344A NONDISPLACED FRACTURE OF BASE OF FOURTH METACARPAL BONE, RIGHT HAND, INITIAL ENCOUNTER FOR CLOSED FRACTURE: ICD-10-CM

## 2023-05-15 DIAGNOSIS — S62.141A DISPLACED FRACTURE OF BODY OF HAMATE [UNCIFORM] BONE, RIGHT WRIST, INITIAL ENCOUNTER FOR CLOSED FRACTURE: ICD-10-CM
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
## 2023-05-15 DIAGNOSIS — S62.346A NONDISPLACED FRACTURE OF BASE OF FIFTH METACARPAL BONE, RIGHT HAND, INITIAL ENCOUNTER FOR CLOSED FRACTURE: ICD-10-CM

## 2023-05-15 DIAGNOSIS — M79.641 PAIN IN RIGHT HAND: ICD-10-CM
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
## 2023-05-15 PROCEDURE — 73130 X-RAY EXAM OF HAND: CPT | Mod: 26,RT

## 2023-05-15 PROCEDURE — 26600 TREAT METACARPAL FRACTURE: CPT | Mod: 54,RT

## 2023-05-15 PROCEDURE — 29125 APPL SHORT ARM SPLINT STATIC: CPT | Mod: RT

## 2023-05-15 PROCEDURE — 99285 EMERGENCY DEPT VISIT HI MDM: CPT | Mod: 57

## 2023-05-15 PROCEDURE — 99284 EMERGENCY DEPT VISIT MOD MDM: CPT | Mod: 25

## 2023-05-15 PROCEDURE — 73110 X-RAY EXAM OF WRIST: CPT | Mod: RT

## 2023-05-15 PROCEDURE — 73130 X-RAY EXAM OF HAND: CPT | Mod: RT

## 2023-05-15 PROCEDURE — 73110 X-RAY EXAM OF WRIST: CPT | Mod: 26,RT

## 2023-05-15 RX ORDER — IBUPROFEN 200 MG
600 TABLET ORAL ONCE
Refills: 0 | Status: COMPLETED | OUTPATIENT
Start: 2023-05-15 | End: 2023-05-15

## 2023-05-15 RX ADMIN — Medication 600 MILLIGRAM(S): at 22:40

## 2023-05-16 NOTE — ED PROVIDER NOTE - PATIENT PORTAL LINK FT
You can access the FollowMyHealth Patient Portal offered by [facility] by registering at the following website: [email or web address]. By joining [employer]’s FollowMyHealth portal, you will also be able to view your health information using other applications (apps) compatible with our system.

## 2023-05-16 NOTE — ED ADULT NURSE NOTE - NSFALLUNIVINTERV_ED_ALL_ED
Bed/Stretcher in lowest position, wheels locked, appropriate side rails in place/Call bell, personal items and telephone in reach/Instruct patient to call for assistance before getting out of bed/chair/stretcher/Non-slip footwear applied when patient is off stretcher/[place] to call system/Physically safe environment - no spills, clutter or unnecessary equipment/Purposeful proactive rounding/Room/bathroom lighting operational, light cord in reach

## 2023-05-16 NOTE — ED PROVIDER NOTE - ATTENDING APP SHARED VISIT CONTRIBUTION OF CARE
38-year-old male no reported past medical history presents to the emergency department with right lateral hand pain after punching a door prior to arrival.  Patient denies numbness, tingling, weakness.    CONSTITUTIONAL: NAD.   SKIN: warm, dry  HEAD: Normocephalic; atraumatic.  EYES: no conjunctival injection.    ENT: MMM.   NECK: Supple.  CARD: RRR.   ABD: soft ntnd.   EXT: +ttp to R ulnar wrist/hand, mild edema, full passive ROM limited active ROM due to pain of R wrist, distal pulses symmetric, cap refill <2 seconds   NEURO: Alert, oriented, grossly unremarkable. sensation intact throughout bilateral UE.   PSYCH: Cooperative, appropriate.

## 2023-05-16 NOTE — ED PROVIDER NOTE - PHYSICAL EXAMINATION
Physical Exam    Constitutional: No acute distress.   Eyes: Conjunctiva pink, Sclera clear, PERRLA, EOMI.  ENT: No sinus tenderness. No nasal discharge. No oropharyngeal erythema, edema, or exudates. Uvula midline.   Cardiovascular: Regular rate, regular rhythm. No noted murmurs rubs or gallops.  Respiratory: unlabored respiratory effort, clear to auscultation bilaterally no wheezing, rales or rhonchi  Gastrointestinal: Normal bowel sounds. soft, non distended, non-tender abdomen.   Musculoskeletal: supple neck, no midline tenderness. Tenderness and slight bony prominence of the base of the 4th and 5th MCP volar aspect. Some tenderness of thenar prominence. Full sensation and strength  Integumentary: warm, dry, no rash  Neurologic: awake, alert, cranial nerves II-XII grossly intact, extremities’ motor and sensory functions grossly intact  Psychiatric: appropriate mood, appropriate affect

## 2023-05-16 NOTE — ED PROVIDER NOTE - NSFOLLOWUPINSTRUCTIONS_ED_ALL_ED_FT
Please keep splint clean, dry and in place. Follow up with Hand surgery as soon as possible ideally 1-3 days.     Boxer Fracture    WHAT YOU NEED TO KNOW:    A boxer fracture is a break of a bone in your hand. This type of fracture usually happens in the bone that connects your wrist to your little finger. It can also happen in bone that connects your wrist to your ring finger. A boxer fracture occurs when you hit an object with a closed fist. The bone may be out of place or in pieces. An open fracture is when there is a break in the skin.    DISCHARGE INSTRUCTIONS:    Medicines:     Medicines may be given to decrease pain. Ask your healthcare provider how to take prescription pain medicine safely. If you have an open wound, you may also be given antibiotics or a tetanus vaccine to prevent an infection.      Take your medicine as directed. Contact your healthcare provider if you think your medicine is not helping or if you have side effects. Tell him or her if you are allergic to any medicine. Keep a list of the medicines, vitamins, and herbs you take. Include the amounts, and when and why you take them. Bring the list or the pill bottles to follow-up visits. Carry your medicine list with you in case of an emergency.    Follow up with your healthcare provider or orthopedist as directed: You may need to return for more x-rays to check bone position. Write down your questions so you remember to ask them during your visits.     Apply ice: Apply ice on your injury for 15 to 20 minutes every hour or as directed. Use an ice pack, or put crushed ice in a plastic bag. Cover it with a towel. Ice helps prevent tissue damage and decreases swelling and pain.    Elevate your hand: Elevate your hand above the level of your heart as often as you can. This will help decrease swelling and pain. Prop your hand on pillows or blankets to keep it elevated comfortably.     Go to physical therapy: A physical therapist teaches you exercises to help improve movement and strength, and to decrease pain.    Wound care: Care for your wound as directed. Carefully wash the wound with soap and water. Dry the area and put on new, clean bandages as directed. Change your bandages when they get wet or dirty.    Contact your healthcare provider or orthopedist if:     You have a fever.      Your open wound is red, swollen, or draining pus.      You have trouble moving your finger.      You have questions or concerns about your condition or care.    Return to the emergency department if:     You cannot bend or extend your finger.      You have severe pain.      You have numbness or tingling in your finger.         © Copyright Isabella Oliver 2019 All illustrations and images included in CareNotes are the copyrighted property of [employer] or [employer].

## 2023-05-16 NOTE — ED PROVIDER NOTE - CARE PROVIDER_API CALL
Mike Buckner)  Orthopaedic Surgery  [address] [place] [address]  Phone: [phone]  Fax: [phone]  Follow Up Time:

## 2023-05-16 NOTE — ED PROVIDER NOTE - CLINICAL SUMMARY MEDICAL DECISION MAKING FREE TEXT BOX
38-year-old male presents with right hand pain after punching a wall.  Fracture shown on x-ray, patient placed in splint.  Patient neurovascularly intact.  Patient will follow-up with orthopedic surgery. Patient to be discharged from ED. Any available test results were discussed with patient and/or family. Verbal instructions given, including instructions to return to ED immediately for any new, worsening, or concerning symptoms. Patient endorsed understanding. Written discharge instructions additionally given, including follow-up plan.

## 2023-05-16 NOTE — ED PROVIDER NOTE - OBJECTIVE STATEMENT
38 year old male presents to the ED with right hand pain after punching a door. Pain located over the base of hand near the wrist worse with extension of the wrist. Admits to some soft tissue swelling and slight bony deformity. Denies numbness and tingling of the hand, lacerations, fever, chills, chest pain, shortness of breath, abdominal pain, nausea, vomiting, diarrhea, constipation, dysuria, hematuria, lower extremity swelling, rash.

## 2023-05-16 NOTE — ED PROVIDER NOTE - DATE/TIME 1
Is This A New Presentation, Or A Follow-Up?: Skin Lesion Has Your Skin Lesion Been Treated?: not been treated 16-May-2023 00:34

## 2023-05-17 ENCOUNTER — APPOINTMENT (OUTPATIENT)
Dept: ORTHOPEDIC SURGERY | Facility: CLINIC | Age: 38
End: 2023-05-17
Payer: MEDICAID

## 2023-05-17 VITALS — BODY MASS INDEX: 23.7 KG/M2 | WEIGHT: 175 LBS | HEIGHT: 72 IN

## 2023-05-17 PROCEDURE — 73110 X-RAY EXAM OF WRIST: CPT | Mod: RT

## 2023-05-17 PROCEDURE — 73130 X-RAY EXAM OF HAND: CPT | Mod: RT

## 2023-05-17 PROCEDURE — 99203 OFFICE O/P NEW LOW 30 MIN: CPT | Mod: 25

## 2023-05-17 PROCEDURE — 29125 APPL SHORT ARM SPLINT STATIC: CPT | Mod: RT

## 2023-05-17 RX ORDER — MELOXICAM 15 MG/1
15 TABLET ORAL
Qty: 30 | Refills: 1 | Status: ACTIVE | COMMUNITY
Start: 2023-05-17 | End: 1900-01-01

## 2023-05-17 NOTE — IMAGING
[de-identified] : Examination of right hand/wrist: Moderate swelling appreciated at the base of the fourth and fifth metacarpals.  No ecchymosis erythema appreciated.  Skin is intact.  Patient mildly tender to palpation over the hamate and base of the fourth and fifth metacarpals of the right hand.  Patient unable to make a full fist at this time.  Depression of the fourth and fifth knuckles appreciated.  Skin is intact.  Neurovascular intact.  Strength limited secondary to pain and swelling.  Patient able to fully extend fingers at this time.  Range of motion of the wrist limited secondary to pain and swelling.  Vascular intact.

## 2023-05-17 NOTE — DATA REVIEWED
[FreeTextEntry1] : X-rays of the right hand taken in the office today: Acute closed displaced fractures at the base of the fourth and fifth metacarpals with loss of the articular surface between the fourth and fifth metacarpals and the hamate of the right wrist.\par \par X-rays of the right wrist + CMC view taken in the office today: Acute closed displaced fracture of the hamate with loss of the articular surface between the hamate and the base of the fourth and fifth metacarpals of the right hand.

## 2023-05-17 NOTE — DISCUSSION/SUMMARY
[de-identified] : Treatment plan as discussed:\par \par The patient has an acute closed displaced fracture of the hamate of the right wrist along with acute closed displaced fractures at the base of the fourth and fifth metacarpals of the right hand with loss of articular surface between the hamate and the base of the fourth and fifth metacarpals.  Patient was placed in a dorsal volar plaster slab in the office today.  He tolerated procedure well.  He will be sent for a stat CAT scan to further evaluate the fractures.  Advised patient that the fracture pain is likely surgical.  He will follow-up with Dr. Little within the week following the CAT scan results for further evaluation and treatment.\par \par I recommended anti-inflammatory medication. Meloxicam sent to patient's pharmacy to be taken as needed for pain. Benefits discussed. Confirmed no contraindication to NSAIDs.\par \par I recommended patient rest, ice, compress, and elevate the wrist regularly. Encouraged activity modification as tolerable. Encouraged gentle range of motion to avoid stiffness.\par \par All questions and concerns addressed to patient's satisfaction. Patient expresses full understanding of treatment plan.\par Patient will follow up with Dr. Little in 1 week for further evaluation and treatment.

## 2023-05-17 NOTE — HISTORY OF PRESENT ILLNESS
[de-identified] : Patient is a 38-year-old male here for evaluation of his right hand/wrist pain.  Patient reports on 5/14/2023 he punched a door with a clenched fist.  He was seen at the ER where x-rays were taken which confirmed acute closed displaced fracture of the hamate and acute closed nondisplaced fractures of the base of the fourth and fifth metacarpals with loss of the articular surface between the hamate and the metacarpals..  He was placed in the splint and advised to follow-up with an orthopedic specialist.

## 2023-05-17 NOTE — HISTORY OF PRESENT ILLNESS
[de-identified] : Patient is a 38-year-old male here for evaluation of his right hand/wrist pain.  Patient reports on 5/14/2023 he punched a door with a clenched fist.  He was seen at the ER where x-rays were taken which confirmed acute closed displaced fracture of the hamate and acute closed nondisplaced fractures of the base of the fourth and fifth metacarpals with loss of the articular surface between the hamate and the metacarpals..  He was placed in the splint and advised to follow-up with an orthopedic specialist.

## 2023-05-17 NOTE — IMAGING
[de-identified] : Examination of right hand/wrist: Moderate swelling appreciated at the base of the fourth and fifth metacarpals.  No ecchymosis erythema appreciated.  Skin is intact.  Patient mildly tender to palpation over the hamate and base of the fourth and fifth metacarpals of the right hand.  Patient unable to make a full fist at this time.  Depression of the fourth and fifth knuckles appreciated.  Skin is intact.  Neurovascular intact.  Strength limited secondary to pain and swelling.  Patient able to fully extend fingers at this time.  Range of motion of the wrist limited secondary to pain and swelling.  Vascular intact.

## 2023-05-17 NOTE — DISCUSSION/SUMMARY
[de-identified] : Treatment plan as discussed:\par \par The patient has an acute closed displaced fracture of the hamate of the right wrist along with acute closed displaced fractures at the base of the fourth and fifth metacarpals of the right hand with loss of articular surface between the hamate and the base of the fourth and fifth metacarpals.  Patient was placed in a dorsal volar plaster slab in the office today.  He tolerated procedure well.  He will be sent for a stat CAT scan to further evaluate the fractures.  Advised patient that the fracture pain is likely surgical.  He will follow-up with Dr. Little within the week following the CAT scan results for further evaluation and treatment.\par \par I recommended anti-inflammatory medication. Meloxicam sent to patient's pharmacy to be taken as needed for pain. Benefits discussed. Confirmed no contraindication to NSAIDs.\par \par I recommended patient rest, ice, compress, and elevate the wrist regularly. Encouraged activity modification as tolerable. Encouraged gentle range of motion to avoid stiffness.\par \par All questions and concerns addressed to patient's satisfaction. Patient expresses full understanding of treatment plan.\par Patient will follow up with Dr. Little in 1 week for further evaluation and treatment.

## 2023-05-20 ENCOUNTER — RESULT REVIEW (OUTPATIENT)
Age: 38
End: 2023-05-20

## 2023-05-20 ENCOUNTER — OUTPATIENT (OUTPATIENT)
Dept: OUTPATIENT SERVICES | Facility: HOSPITAL | Age: 38
LOS: 1 days | End: 2023-05-20
Payer: MEDICAID

## 2023-05-20 DIAGNOSIS — Z00.8 ENCOUNTER FOR OTHER GENERAL EXAMINATION: ICD-10-CM

## 2023-05-20 DIAGNOSIS — M25.539 PAIN IN UNSPECIFIED WRIST: ICD-10-CM

## 2023-05-20 PROCEDURE — 73200 CT UPPER EXTREMITY W/O DYE: CPT | Mod: RT

## 2023-05-20 PROCEDURE — 73200 CT UPPER EXTREMITY W/O DYE: CPT | Mod: 26,RT

## 2023-05-21 DIAGNOSIS — M25.539 PAIN IN UNSPECIFIED WRIST: ICD-10-CM

## 2023-05-24 ENCOUNTER — APPOINTMENT (OUTPATIENT)
Dept: ORTHOPEDIC SURGERY | Facility: CLINIC | Age: 38
End: 2023-05-24
Payer: MEDICAID

## 2023-05-24 PROCEDURE — 99215 OFFICE O/P EST HI 40 MIN: CPT

## 2023-05-24 NOTE — ASSESSMENT
[FreeTextEntry1] : Patient comes in with a closed displaced fracture of base of fifth and fourth metacarpal bone of the right hand and closed displaced fracture of hamate bone of the right wrist. On 5/15/23 he punched a door. He was placed into a splint.  He got a CAT scan of his wrist.  We have been trying to get in touch with him for the past couple of weeks to schedule surgery for the patient.  The patient states that maybe we do not have the correct phone number.  He also states that he thought today was supposed to be surgery in the office.\par \par Right upper extremity: In splint which is fitting him well, full range of motion of fingers out of splint, neurovascular intact\par \par X-rays show fourth and fifth CMC fracture dislocation with the hamate fracture\par CT scan shows the displacement of the fractures and intra-articular involvement\par \par Patient has a fourth and fifth CMC fracture dislocation with the hamate fracture.  I discussed with the patient that this is surgical.  I discussed with him that goes into the joint.  I did let him know that we have been trying to get in touch with him for couple of weeks to schedule him for surgery.  He states he did not know that.  He would like to move forward with surgical intervention.  I went through risk benefits alternatives of surgery with the patient.  I discussed with him posttraumatic arthritis.  I did discuss with him an ORIF of the hamate as well as pinning of fourth and fifth base of the metacarpals.  He understood and wished to move forward with surgery. R/B/A of surgery discussed with the patient. Risks including but not limited to infection, nerve damage, tendon damage, pain, stiffness, arthritis, malunion, nonunion, loss of function, limb or life. They understand and agree to surgery \par Return post op\par

## 2023-05-26 ENCOUNTER — OUTPATIENT (OUTPATIENT)
Dept: OUTPATIENT SERVICES | Facility: HOSPITAL | Age: 38
LOS: 1 days | End: 2023-05-26
Payer: MEDICAID

## 2023-05-26 VITALS
OXYGEN SATURATION: 100 % | WEIGHT: 169.98 LBS | SYSTOLIC BLOOD PRESSURE: 124 MMHG | TEMPERATURE: 97 F | HEART RATE: 79 BPM | DIASTOLIC BLOOD PRESSURE: 65 MMHG | RESPIRATION RATE: 16 BRPM | HEIGHT: 72 IN

## 2023-05-26 DIAGNOSIS — S62.316D DISPLACED FRACTURE OF BASE OF FIFTH METACARPAL BONE, RIGHT HAND, SUBSEQUENT ENCOUNTER FOR FRACTURE WITH ROUTINE HEALING: ICD-10-CM

## 2023-05-26 DIAGNOSIS — S62.316A DISPLACED FRACTURE OF BASE OF FIFTH METACARPAL BONE, RIGHT HAND, INITIAL ENCOUNTER FOR CLOSED FRACTURE: ICD-10-CM

## 2023-05-26 DIAGNOSIS — Z01.818 ENCOUNTER FOR OTHER PREPROCEDURAL EXAMINATION: ICD-10-CM

## 2023-05-26 LAB
APPEARANCE UR: CLEAR — SIGNIFICANT CHANGE UP
BACTERIA # UR AUTO: NEGATIVE — SIGNIFICANT CHANGE UP
BILIRUB UR-MCNC: NEGATIVE — SIGNIFICANT CHANGE UP
COLOR SPEC: YELLOW — SIGNIFICANT CHANGE UP
DIFF PNL FLD: NEGATIVE — SIGNIFICANT CHANGE UP
EPI CELLS # UR: 0 /HPF — SIGNIFICANT CHANGE UP (ref 0–5)
GLUCOSE UR QL: NEGATIVE — SIGNIFICANT CHANGE UP
HYALINE CASTS # UR AUTO: 0 /LPF — SIGNIFICANT CHANGE UP (ref 0–7)
KETONES UR-MCNC: SIGNIFICANT CHANGE UP
LEUKOCYTE ESTERASE UR-ACNC: NEGATIVE — SIGNIFICANT CHANGE UP
NITRITE UR-MCNC: NEGATIVE — SIGNIFICANT CHANGE UP
PH UR: 6.5 — SIGNIFICANT CHANGE UP (ref 5–8)
PROT UR-MCNC: ABNORMAL
RBC CASTS # UR COMP ASSIST: 0 /HPF — SIGNIFICANT CHANGE UP (ref 0–4)
SP GR SPEC: 1.04 — HIGH (ref 1.01–1.03)
UROBILINOGEN FLD QL: ABNORMAL
WBC UR QL: 1 /HPF — SIGNIFICANT CHANGE UP (ref 0–5)

## 2023-05-26 PROCEDURE — 99214 OFFICE O/P EST MOD 30 MIN: CPT | Mod: 25

## 2023-05-26 PROCEDURE — 81001 URINALYSIS AUTO W/SCOPE: CPT

## 2023-05-26 NOTE — H&P PST ADULT - HISTORY OF PRESENT ILLNESS
39 y/o male presents to PAST in preparation for CLOSED REDUCTION PERCUTANEUS PINNING POSSIBLE OPEN REDUCTION INTERNAL FIXATION HAMATE AND BASE OF FOURTH AND FIFTH RIGHT CARPOMETACARPAL FRACTURES in [facility] under [place] on 6/1/23 with Dr. Little    Pt states that on 5/15/23 had punched a door with his right hand causing a fracture to the base of fifth and fourth metacarpal bone of the right hand and closed displaced fracture of hamate bone of the right wrist. He had gone to the ER and was placed in a splint. Pt with current pain of 7/10, sharp in nature.  Pt now for above procedure.    PATIENT CURRENTLY DENIES CHEST PAIN  SHORTNESS OF BREATH  PALPITATIONS,  DYSURIA, OR UPPER RESPIRATORY INFECTION IN PAST 2 WEEKS  EXERCISE  TOLERANCE  5-6 FLIGHT OF STAIRS  WITHOUT SHORTNESS OF BREATH  pt denies any covid s/s, or tested positive in the past  pt advised self quarantine till day of procedure  Patient verbalized understanding of instructions and was given the opportunity to ask questions and have them answered.  As per patient, this is their complete medical and surgical history, including medications both prescribed or over the counter.  written and verbal instructions with teach back on chlorhexidine shampoo provided,  pt verbalized understanding with returned demonstration    Anesthesia Alert  NO--Difficult Airway  NO--History of neck surgery or radiation  NO--Limited ROM of neck  NO--History of Malignant hyperthermia  NO--Personal or family history of Pseudocholinesterase deficiency.  NO--Prior Anesthesia Complication  NO--Latex Allergy  NO--Loose teeth  NO--History of Rheumatoid Arthritis  NO--LUZ  NO--Bleeding risk  NO--Other_____  Mallampati airway: Class I    Displaced fracture of base of fifth metacarpal bone, right hand, initial encounter for closed fracture    Encounter for other preprocedural examination    No pertinent past medical history    No significant past surgical history    30675 (2X);  73329 (2X)    SysAdmin_VstLnk

## 2023-05-26 NOTE — H&P PST ADULT - REASON FOR ADMISSION
37 y/o male presents to PAST in preparation for CLOSED REDUCTION PERCUTANEUS PINNING POSSIBLE OPEN REDUCTION INTERNAL FIXATION HAMATE AND BASE OF FOURTH AND FIFTH RIGHT CARPOMETACARPAL FRACTURES in [facility] under [place] on 6/1/23 with Dr. Little

## 2023-05-27 DIAGNOSIS — Z01.818 ENCOUNTER FOR OTHER PREPROCEDURAL EXAMINATION: ICD-10-CM

## 2023-05-27 DIAGNOSIS — S62.316A DISPLACED FRACTURE OF BASE OF FIFTH METACARPAL BONE, RIGHT HAND, INITIAL ENCOUNTER FOR CLOSED FRACTURE: ICD-10-CM

## 2023-05-31 NOTE — ASU PATIENT PROFILE, ADULT - FALL HARM RISK - UNIVERSAL INTERVENTIONS
Bed in lowest position, wheels locked, appropriate side rails in place/Call bell, personal items and telephone in reach/Instruct patient to call for assistance before getting out of bed or chair/Non-slip footwear when patient is out of bed/[place] to call system/Physically safe environment - no spills, clutter or unnecessary equipment/Purposeful Proactive Rounding/Room/bathroom lighting operational, light cord in reach

## 2023-05-31 NOTE — ASU PATIENT PROFILE, ADULT - BARIATRIC
no Spray Paint Text: The liquid nitrogen was applied to the skin utilizing a spray paint frosting technique. Number Of Freeze-Thaw Cycles: 5 freeze-thaw cycles Post-Care Instructions: I reviewed with the patient in detail post-care instructions. Patient is to wear sunprotection, and avoid picking at any of the treated lesions. Pt may apply Vaseline to crusted or scabbing areas. Add 52 Modifier (Optional): no Show Aperture Variable?: Yes Medical Necessity Clause: This procedure was medically necessary because the lesions that were treated were: Detail Level: Simple Consent: The patient's consent was obtained including but not limited to risks of crusting, scabbing, blistering, scarring, darker or lighter pigmentary change, recurrence, incomplete removal and infection. Medical Necessity Information: It is in your best interest to select a reason for this procedure from the list below. All of these items fulfill various CMS LCD requirements except the new and changing color options. Number Of Freeze-Thaw Cycles: 3 freeze-thaw cycles Duration Of Freeze Thaw-Cycle (Seconds): 3 Detail Level: Zone

## 2023-06-01 ENCOUNTER — TRANSCRIPTION ENCOUNTER (OUTPATIENT)
Age: 38
End: 2023-06-01

## 2023-06-01 ENCOUNTER — OUTPATIENT (OUTPATIENT)
Dept: INPATIENT UNIT | Facility: HOSPITAL | Age: 38
LOS: 1 days | Discharge: HOME | End: 2023-06-01
Payer: MEDICAID

## 2023-06-01 ENCOUNTER — APPOINTMENT (OUTPATIENT)
Dept: ORTHOPEDIC SURGERY | Facility: AMBULATORY SURGERY CENTER | Age: 38
End: 2023-06-01

## 2023-06-01 VITALS
HEIGHT: 72 IN | RESPIRATION RATE: 18 BRPM | WEIGHT: 169.98 LBS | TEMPERATURE: 98 F | SYSTOLIC BLOOD PRESSURE: 125 MMHG | HEART RATE: 74 BPM | DIASTOLIC BLOOD PRESSURE: 69 MMHG | OXYGEN SATURATION: 98 %

## 2023-06-01 VITALS
SYSTOLIC BLOOD PRESSURE: 111 MMHG | DIASTOLIC BLOOD PRESSURE: 67 MMHG | OXYGEN SATURATION: 98 % | HEART RATE: 68 BPM | RESPIRATION RATE: 20 BRPM

## 2023-06-01 DIAGNOSIS — S62.314A DISPLACED FRACTURE OF BASE OF FOURTH METACARPAL BONE, RIGHT HAND, INITIAL ENCOUNTER FOR CLOSED FRACTURE: ICD-10-CM
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
## 2023-06-01 DIAGNOSIS — S62.141A DISPLACED FRACTURE OF BODY OF HAMATE [UNCIFORM] BONE, RIGHT WRIST, INITIAL ENCOUNTER FOR CLOSED FRACTURE: ICD-10-CM

## 2023-06-01 DIAGNOSIS — S63.054A DISLOCATION OF OTHER CARPOMETACARPAL JOINT OF RIGHT HAND, INITIAL ENCOUNTER: ICD-10-CM
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
## 2023-06-01 DIAGNOSIS — S62.316A DISPLACED FRACTURE OF BASE OF FIFTH METACARPAL BONE, RIGHT HAND, INITIAL ENCOUNTER FOR CLOSED FRACTURE: ICD-10-CM

## 2023-06-01 PROCEDURE — 26676 PIN HAND DISLOCATION: CPT | Mod: F9

## 2023-06-01 PROCEDURE — 25645 OPTX CRPL FX OTH/THN SCPH EA: CPT | Mod: RT

## 2023-06-01 PROCEDURE — C1713: CPT

## 2023-06-01 RX ORDER — OXYCODONE AND ACETAMINOPHEN 5; 325 MG/1; MG/1
1 TABLET ORAL
Qty: 15 | Refills: 0
Start: 2023-06-01

## 2023-06-01 RX ORDER — SODIUM CHLORIDE 9 MG/ML
1000 INJECTION, SOLUTION INTRAVENOUS
Refills: 0 | Status: DISCONTINUED | OUTPATIENT
Start: 2023-06-01 | End: 2023-06-01

## 2023-06-01 RX ORDER — HYDROMORPHONE HYDROCHLORIDE 2 MG/ML
0.5 INJECTION INTRAMUSCULAR; INTRAVENOUS; SUBCUTANEOUS
Refills: 0 | Status: DISCONTINUED | OUTPATIENT
Start: 2023-06-01 | End: 2023-06-01

## 2023-06-01 RX ORDER — MELOXICAM 15 MG/1
1 TABLET ORAL
Refills: 0 | DISCHARGE

## 2023-06-01 RX ORDER — OXYCODONE HYDROCHLORIDE 5 MG/1
5 TABLET ORAL ONCE
Refills: 0 | Status: DISCONTINUED | OUTPATIENT
Start: 2023-06-01 | End: 2023-06-01

## 2023-06-01 RX ORDER — ONDANSETRON 8 MG/1
4 TABLET, FILM COATED ORAL ONCE
Refills: 0 | Status: COMPLETED | OUTPATIENT
Start: 2023-06-01 | End: 2023-06-01

## 2023-06-01 RX ADMIN — HYDROMORPHONE HYDROCHLORIDE 0.5 MILLIGRAM(S): 2 INJECTION INTRAMUSCULAR; INTRAVENOUS; SUBCUTANEOUS at 16:25

## 2023-06-01 RX ADMIN — ONDANSETRON 4 MILLIGRAM(S): 8 TABLET, FILM COATED ORAL at 16:29

## 2023-06-01 RX ADMIN — SODIUM CHLORIDE 100 MILLILITER(S): 9 INJECTION, SOLUTION INTRAVENOUS at 16:29

## 2023-06-01 NOTE — BRIEF OPERATIVE NOTE - NSICDXBRIEFPROCEDURE_GEN_ALL_CORE_FT
PROCEDURES:  Open reduction and internal fixation (ORIF) of fracture of hamate 01-Jun-2023 16:13:39  Matilda Little

## 2023-06-01 NOTE — PRE-ANESTHESIA EVALUATION ADULT - NSDENTALSD_ENT_ALL_CORE
missing front upper right tooth/appears normal and intact/missing teeth [email or web address]

## 2023-06-01 NOTE — ASU DISCHARGE PLAN (ADULT/PEDIATRIC) - NS MD DC FALL RISK RISK
For information on Fall & Injury Prevention, visit: [email or web address] OR  [email or web address] OR  https://www.cdc.gov/steadi/patient.html

## 2023-06-01 NOTE — CHART NOTE - NSCHARTNOTEFT_GEN_A_CORE
PACU ANESTHESIA ADMISSION NOTE      Procedure: Open reduction and internal fixation (ORIF) of fracture of hamate      Post op diagnosis:  Closed fracture of hamate of right wrist        ____  Intubated  TV:______       Rate: ______      FiO2: ______    _x___  Patent Airway    _x___  Full return of protective reflexes    __  Full recovery from anesthesia / back to baseline status    Vitals:            T:  97.8              BP :   123/75             R:   16           Sat:  100             P: 83      Mental Status:  _x___ Awake   _____ Alert   _____ Drowsy   _____ Sedated    Nausea/Vomiting:  _x___  NO       ______Yes,   See Post - Op Orders         Pain Scale (0-10):  __0___    Treatment: _x___ None    ____ See Post - Op/PCA Orders    Post - Operative Fluids:   __x__ Oral   ____ See Post - Op Orders    Plan: Discharge:   _x___Home       _____Floor     _____Critical Care    _____  Other:_________________    Comments:  No anesthesia issues or complications noted.  Discharge when criteria met.

## 2023-06-02 RX ORDER — OXYCODONE AND ACETAMINOPHEN 5; 325 MG/1; MG/1
5-325 TABLET ORAL
Qty: 5 | Refills: 0 | Status: ACTIVE | COMMUNITY
Start: 2023-06-02 | End: 1900-01-01

## 2023-06-02 RX ORDER — IBUPROFEN 600 MG/1
600 TABLET ORAL 4 TIMES DAILY
Qty: 120 | Refills: 0 | Status: ACTIVE | COMMUNITY
Start: 2023-06-02 | End: 1900-01-01

## 2023-06-13 ENCOUNTER — APPOINTMENT (OUTPATIENT)
Dept: ORTHOPEDIC SURGERY | Facility: CLINIC | Age: 38
End: 2023-06-13
Payer: MEDICAID

## 2023-06-13 PROBLEM — Z87.81 PERSONAL HISTORY OF (HEALED) TRAUMATIC FRACTURE: Chronic | Status: ACTIVE | Noted: 2023-05-26

## 2023-06-13 PROCEDURE — 73130 X-RAY EXAM OF HAND: CPT | Mod: RT

## 2023-06-13 PROCEDURE — 99024 POSTOP FOLLOW-UP VISIT: CPT

## 2023-06-13 NOTE — IMAGING
[de-identified] : Mild hand swelling\par Sutures removed\par Healed incision\par No evidence of infection\par Mild tenderness at the surgical site\par Stiffness/decreased range of motion of the fingers upon full flexion.  Mildly tender to palpation over the base of the fourth and fifth metacarpals.\par

## 2023-06-13 NOTE — DISCUSSION/SUMMARY
[de-identified] : X-rays reveal proper alignment of surgical hardware.  The patient was removed out of his postsurgical splint in the office today and transition to a fiberglass short arm cast.  He will return in 6 weeks from the time of surgery for cast off and for removal of his pins.  Will call with any questions or concerns in the meantime.  He reports he has an adequate supply of anti-inflammatory/pain medication at this time.\par The patient's sutures were removed in the office today.  The surgical incision site is clean dry and intact and without any signs of infection.\par We discussed scar massage.  Encouraged gentle range of motion of the wrist and fingers.  Encouraged patient to work on making a full fist.\par Advised to refrain from heavy lifting/pulling/pushing until repeat evaluation.\par Patient understands that some residual pain, swelling, and numbness/tingling following surgery is normal and expected.  Patient understands that any numbness/tingling experienced after 6 months is likely permanent.\par All questions and concerns addressed to patient's satisfaction. Patient expresses full understanding of treatment plan.\par

## 2023-06-13 NOTE — HISTORY OF PRESENT ILLNESS
[de-identified] : 38-year-old male here for his first postop evaluation status post CRPP ORIF and base fifth CMC 6/1/2023.  He is doing well has no significant plaints at this time.  Pain is well controlled.  He has remained in his postsurgical splint.

## 2023-06-13 NOTE — DATA REVIEWED
[FreeTextEntry1] : X-rays of right hand taken in the office today: Surgical hardware in place and in good alignment.  No lucencies appreciated.  No significant abnormalities appreciated.

## 2023-07-13 ENCOUNTER — APPOINTMENT (OUTPATIENT)
Dept: ORTHOPEDIC SURGERY | Facility: CLINIC | Age: 38
End: 2023-07-13
Payer: MEDICAID

## 2023-07-13 PROCEDURE — 73130 X-RAY EXAM OF HAND: CPT | Mod: RT

## 2023-07-13 PROCEDURE — 99024 POSTOP FOLLOW-UP VISIT: CPT

## 2023-07-13 NOTE — DISCUSSION/SUMMARY
H&P reviewed  After examining the patient I find no changes in the patients condition since the H&P had been written 
[de-identified] : X-rays reveal proper alignment of surgical hardware.  Patient's cast was taken off and office today.  His pins were also removed.  I transitioned him to a cock-up wrist brace.  A prescription for occupational therapy was provided to go next-door for a custom brace and to start therapy.\par \par The patient's sutures were so removed in the office today.  The surgical incision site is clean dry and intact and without any signs of infection.\par We discussed scar massage.  Encouraged gentle range of motion of the wrist and fingers.  Encouraged patient to work on making a full fist.\par Advised to refrain from heavy lifting/pulling/pushing until repeat evaluation.\par Patient understands that some residual pain, swelling, and numbness/tingling following surgery is normal and expected.  Patient understands that any numbness/tingling experienced after 6 months is likely permanent.\par All questions and concerns addressed to patient's satisfaction. Patient expresses full understanding of treatment plan.\par Patient will follow up in 1 month with Dr. Little for repeat evaluation and treatment.

## 2023-07-13 NOTE — IMAGING
[de-identified] : Mild hand swelling\par Sutures removed\par Healed incision\par No evidence of infection\par Mild tenderness at the surgical site\par Stiffness/decreased range of motion of the fingers upon full flexion.  Mildly tender to palpation over the base of the fourth and fifth metacarpals.\par

## 2023-07-13 NOTE — HISTORY OF PRESENT ILLNESS
[de-identified] : 38-year-old male here for his first postop evaluation status post CRPP ORIF and base fifth CMC 6/1/2023.  He is doing well has no significant plaints at this time.  Pain is well controlled.  He has remained in his postsurgical splint.

## 2023-08-11 ENCOUNTER — APPOINTMENT (OUTPATIENT)
Dept: ORTHOPEDIC SURGERY | Facility: CLINIC | Age: 38
End: 2023-08-11

## 2023-08-15 ENCOUNTER — APPOINTMENT (OUTPATIENT)
Dept: ORTHOPEDIC SURGERY | Facility: CLINIC | Age: 38
End: 2023-08-15

## 2023-08-18 NOTE — PRE-ANESTHESIA EVALUATION ADULT - WEIGHT IN KG
77.1
What Type Of Note Output Would You Prefer (Optional)?: Bullet Format
Hpi Title: Evaluation of Skin Lesions
Additional History: Patient reports some rough lesions on his face

## 2023-10-03 ENCOUNTER — APPOINTMENT (OUTPATIENT)
Dept: ORTHOPEDIC SURGERY | Facility: CLINIC | Age: 38
End: 2023-10-03

## 2023-10-10 ENCOUNTER — RESULT CHARGE (OUTPATIENT)
Age: 38
End: 2023-10-10

## 2023-10-10 ENCOUNTER — APPOINTMENT (OUTPATIENT)
Dept: ORTHOPEDIC SURGERY | Facility: CLINIC | Age: 38
End: 2023-10-10
Payer: MEDICAID

## 2023-10-10 DIAGNOSIS — S62.316A DISPLACED FRACTURE OF BASE OF FIFTH METACARPAL BONE, RIGHT HAND, INITIAL ENCOUNTER FOR CLOSED FRACTURE: ICD-10-CM

## 2023-10-10 DIAGNOSIS — S62.314A DISPLACED FRACTURE OF BASE OF FOURTH METACARPAL BONE, RIGHT HAND, INITIAL ENCOUNTER FOR CLOSED FRACTURE: ICD-10-CM

## 2023-10-10 DIAGNOSIS — S62.141A DISPLACED FRACTURE OF BODY OF HAMATE [UNCIFORM] BONE, RIGHT WRIST, INITIAL ENCOUNTER FOR CLOSED FRACTURE: ICD-10-CM

## 2023-10-10 PROCEDURE — 99213 OFFICE O/P EST LOW 20 MIN: CPT

## 2023-10-10 PROCEDURE — 73130 X-RAY EXAM OF HAND: CPT | Mod: RT

## 2023-11-22 NOTE — ED ADULT NURSE NOTE - ALCOHOL PRE SCREEN (AUDIT - C)
Past Medical History:   Diagnosis Date    Abnormal CT scan 2023    Abnormal positron emission tomography (PET) scan 2023    Anxiety     Asbestosis 2015    Atrial fibrillation     Bilateral adrenal adenomas     COPD (chronic obstructive pulmonary disease)     COVID-19 virus infection 2022    Depression     Elevated PSA 2023    High cholesterol     HTN (hypertension)     Malignant neoplasm of prostate     Multiple lung nodules     Nodule of upper lobe of left lung 2023    6 mm spiculated on cta chest 3/21/23    On home oxygen therapy 2023    Pneumonia     Prostate cancer 2020    Prostate cancer metastatic to bone 2023    Prostate cancer metastatic to lung 2023    Ulcerative colitis        Past Surgical History:   Procedure Laterality Date    NECK SURGERY      right knee      TRANSRECTAL BIOPSY OF PROSTATE WITH ULTRASOUND GUIDANCE Bilateral 3/18/2020    Procedure: BIOPSY, PROSTATE, RECTAL APPROACH, WITH US GUIDANCE;  Surgeon: Bill Jeong MD;  Location: [facility];  Service: Urology;  Laterality: Bilateral;       Review of patient's allergies indicates:  No Known Allergies    Current Facility-Administered Medications on File Prior to Encounter   Medication    [] ampicillin (OMNIPEN) 1 gram injection    [] ampicillin (OMNIPEN) 1 gram injection    [DISCONTINUED] abiraterone Tab 1,000 mg    [DISCONTINUED] albuterol-ipratropium 2.5 mg-0.5 mg/3 mL nebulizer solution 3 mL    [DISCONTINUED] albuterol-ipratropium 2.5 mg-0.5 mg/3 mL nebulizer solution 3 mL    [DISCONTINUED] amiodarone tablet 100 mg    [DISCONTINUED] ampicillin (OMNIPEN) 2 g in sodium chloride 0.9 % 100 mL IVPB (MB+)    [DISCONTINUED] atorvastatin tablet 10 mg    [DISCONTINUED] dextrose 10% bolus 125 mL 125 mL    [DISCONTINUED] dextrose 10% bolus 250 mL 250 mL    [DISCONTINUED] diazePAM tablet 5 mg    [DISCONTINUED] diltiaZEM tablet 30 mg    [DISCONTINUED] enoxaparin injection 90 mg     [DISCONTINUED] famotidine tablet 40 mg    [DISCONTINUED] finasteride tablet 5 mg    [DISCONTINUED] fluticasone furoate-vilanteroL 100-25 mcg/dose diskus inhaler 1 puff    [DISCONTINUED] glucagon (human recombinant) injection 1 mg    [DISCONTINUED] glucose chewable tablet 16 g    [DISCONTINUED] glucose chewable tablet 24 g    [DISCONTINUED] LORazepam injection 1 mg    [DISCONTINUED] losartan tablet 25 mg    [DISCONTINUED] morphine injection 2 mg    [DISCONTINUED] naloxone 0.4 mg/mL injection 0.02 mg    [DISCONTINUED] ondansetron disintegrating tablet 8 mg    [DISCONTINUED] oxyCODONE immediate release tablet 5 mg    [DISCONTINUED] paroxetine tablet 40 mg    [DISCONTINUED] polyethylene glycol packet 17 g    [DISCONTINUED] predniSONE tablet 10 mg    [DISCONTINUED] prochlorperazine injection Soln 5 mg    [DISCONTINUED] sodium chloride 0.9% flush 10 mL    [DISCONTINUED] tamsulosin 24 hr capsule 0.4 mg    [DISCONTINUED] trazodone split tablet 100 mg     Current Outpatient Medications on File Prior to Encounter   Medication Sig    abiraterone (ZYTIGA) 250 mg Tab Take 4 tablets (1,000 mg total) by mouth once daily.    albuterol (PROVENTIL/VENTOLIN HFA) 90 mcg/actuation inhaler Rescue    albuterol-ipratropium (DUO-NEB) 2.5 mg-0.5 mg/3 mL nebulizer solution PLACE ONE (1) VIAL IN NEBULIZER AND INHALE EVERY 6 HOURS AS DIRECTED AS NEEDED FOR WHEEZING    amiodarone (PACERONE) 200 MG Tab TAKE ONE (1) TABLET ONCE DAILY FOR HEART RYHTHM    amlodipine-benazepril 5-20 mg (LOTREL) 5-20 mg per capsule   90 cap, 0 Refill(s)    apixaban (ELIQUIS) 5 mg Tab Take 1 tablet (5 mg total) by mouth 2 (two) times daily.    aspirin (ECOTRIN) 81 MG EC tablet Take 1 tablet (81 mg total) by mouth once daily.    atorvastatin (LIPITOR) 10 MG tablet TAKE 1 TABLET AT BEDTIME FOR CHOLESTEROL (TAKE WITH CO-ENZYME Q-10)    bicalutamide (CASODEX) 50 MG Tab Take 1 tablet (50 mg total) by mouth once daily.    budesonide-glycopyr-formoterol (BREZTRI AEROSPHERE)  160-9-4.8 mcg/actuation HFAA Inhale 2 puffs into the lungs 2 (two) times a day.    diltiaZEM (CARDIZEM) 30 MG tablet Take 1 tablet (30 mg total) by mouth every 12 (twelve) hours.    famotidine (PEPCID) 40 MG tablet     ferrous sulfate (FEOSOL) 325 mg (65 mg iron) Tab tablet Take 1 tablet (325 mg total) by mouth every Mon, Wed, Fri.    finasteride (PROSCAR) 5 mg tablet TAKE ONE (1) TABLET EVERY MORNING FOR PROSTATE    ketorolac (TORADOL) 10 mg tablet     levoFLOXacin (LEVAQUIN) 750 MG tablet Take 1 tablet (750 mg total) by mouth once daily.    losartan (COZAAR) 50 MG tablet Take 1 tablet (50 mg total) by mouth 2 (two) times a day. (Patient taking differently: Take 50 mg by mouth once daily.)    methocarbamoL (ROBAXIN) 750 MG Tab Take 1 tablet (750 mg total) by mouth 2 (two) times daily as needed (muscle pain).    OXYGEN-AIR DELIVERY SYSTEMS MISC by [facility] [place].(Non-Drug; Combo Route) route.    pantoprazole (PROTONIX) 40 MG tablet TAKE ONE (1) TABLET EVERY MORNING (30 MINUTES BEFORE BREAKFAST) FOR STOMACH    paroxetine (PAXIL) 40 MG tablet TAKE ONE (1) TABLET EVERY MORNING FOR MOOD AND NERVE PAIN    predniSONE (DELTASONE) 5 MG tablet Take 1 tablet (5 mg total) by mouth once daily.    tamsulosin (FLOMAX) 0.4 mg Cap Take 1 capsule (0.4 mg total) by mouth once daily.    traMADoL (ULTRAM) 50 mg tablet Take 1-2 tablets every 8 hours as needed for pain    traZODone (DESYREL) 100 MG tablet TAKE ONE (1) TABLET AT BEDTIME FOR SLEEP AND NERVE PAIN    triamcinolone acetonide 0.1% (KENALOG) 0.1 % cream Apply topically 2 (two) times daily.     Family History       Problem Relation (Age of Onset)    Cancer Mother    Diabetes Mother, Sister          Tobacco Use    Smoking status: Every Day     Current packs/day: 0.50     Average packs/day: 0.5 packs/day for 50.2 years (25.1 ttl pk-yrs)     Types: Cigarettes     Start date: 9/7/1973     Passive exposure: Past    Smokeless tobacco: Never   Substance and Sexual Activity    Alcohol use: Yes      Comment: 8 16oz beers per day    Drug use: No    Sexual activity: Not Currently     Review of Systems   Constitutional:  Negative for activity change and appetite change.   HENT:  Negative for congestion and dental problem.    Eyes:  Negative for discharge and itching.   Respiratory:  Negative for shortness of breath.    Cardiovascular:  Negative for chest pain.   Gastrointestinal:  Negative for abdominal distention and abdominal pain.   Endocrine: Negative for cold intolerance.   Genitourinary:  Negative for difficulty urinating and dysuria.   Musculoskeletal:  Positive for arthralgias. Negative for back pain.   Skin:  Negative for color change.   Neurological:  Negative for dizziness and facial asymmetry.   Hematological:  Negative for adenopathy.   Psychiatric/Behavioral:  Negative for agitation and behavioral problems.      Objective:     Vital Signs (Most Recent):  Temp: 98.3 °F (36.8 °C) (11/21/23 2350)  Pulse: 94 (11/21/23 2350)  Resp: 18 (11/21/23 2350)  BP: (!) 178/87 (11/21/23 2350)  SpO2: 98 % (11/21/23 2350) Vital Signs (24h Range):  Temp:  [97.7 °F (36.5 °C)-98.3 °F (36.8 °C)] 98.3 °F (36.8 °C)  Pulse:  [84-99] 94  Resp:  [18-26] 18  SpO2:  [89 %-99 %] 98 %  BP: (137-178)/(71-98) 178/87     Weight: 89.4 kg (197 lb 1.5 oz)  Body mass index is 23.37 kg/m².     Physical Exam  Vitals and nursing note reviewed.   Constitutional:       Appearance: He is well-developed.   HENT:      Head: Atraumatic.      Right Ear: External ear normal.      Left Ear: External ear normal.      Nose: Nose normal.      Mouth/Throat:      Mouth: Mucous membranes are dry.   Cardiovascular:      Rate and Rhythm: Normal rate.   Pulmonary:      Effort: Pulmonary effort is normal.   Abdominal:      Palpations: Abdomen is soft.   Musculoskeletal:         General: Normal range of motion.      Cervical back: Full passive range of motion without pain and normal range of motion.      Comments: R knee slightly swollen    Skin:     General:  Skin is warm.   Neurological:      Mental Status: He is alert and oriented to person, place, and time.   Psychiatric:         Behavior: Behavior normal.                Significant Labs: All pertinent labs within the past 24 hours have been reviewed.  CBC:   Recent Labs   Lab 11/20/23  1511 11/21/23  0527   WBC 6.78 5.50   HGB 9.8* 9.5*   HCT 32.3* 31.5*    355     CMP:   Recent Labs   Lab 11/20/23  1511 11/21/23  0527    144   K 3.6 3.7   CL 97 98   CO2 33* 34*   * 114*   BUN 7* 6*   CREATININE 0.6 0.6   CALCIUM 9.7 9.8   PROT 6.9 7.1   ALBUMIN 2.6* 2.5*   BILITOT 0.4 0.3   ALKPHOS 129 129   AST 11 11   ALT 10 11   ANIONGAP 12 12       Significant Imaging: I have reviewed all pertinent imaging results/findings within the past 24 hours.       Statement Selected

## 2025-01-28 NOTE — H&P PST ADULT - HEIGHT IN FEET
1415-Received pt from cath lab s/p PCI via R radial artery (TR Band to site) and R fem artery (perclose to site) Pt drowsy, arouses easily. Denied pain. Post procedure EKG done. Tolerated PO intake. Cardiac Rehab saw pt-stent card given. Discharge instructions given-pt verbalized understanding.Report given to Mildred PEARCE   6